# Patient Record
Sex: MALE | Race: WHITE | NOT HISPANIC OR LATINO | Employment: FULL TIME | URBAN - METROPOLITAN AREA
[De-identification: names, ages, dates, MRNs, and addresses within clinical notes are randomized per-mention and may not be internally consistent; named-entity substitution may affect disease eponyms.]

---

## 2017-03-06 ENCOUNTER — COMMUNICATION - HEALTHEAST (OUTPATIENT)
Dept: TELEHEALTH | Facility: CLINIC | Age: 37
End: 2017-03-06

## 2017-03-06 ENCOUNTER — OFFICE VISIT - HEALTHEAST (OUTPATIENT)
Dept: INTERNAL MEDICINE | Facility: CLINIC | Age: 37
End: 2017-03-06

## 2017-03-06 DIAGNOSIS — F33.9 MAJOR DEPRESSIVE DISORDER, RECURRENT EPISODE (H): ICD-10-CM

## 2018-01-02 ENCOUNTER — COMMUNICATION - HEALTHEAST (OUTPATIENT)
Dept: INTERNAL MEDICINE | Facility: CLINIC | Age: 38
End: 2018-01-02

## 2018-01-02 ENCOUNTER — OFFICE VISIT - HEALTHEAST (OUTPATIENT)
Dept: INTERNAL MEDICINE | Facility: CLINIC | Age: 38
End: 2018-01-02

## 2018-01-02 DIAGNOSIS — F33.9 MAJOR DEPRESSIVE DISORDER, RECURRENT EPISODE (H): ICD-10-CM

## 2018-04-10 ENCOUNTER — COMMUNICATION - HEALTHEAST (OUTPATIENT)
Dept: INTERNAL MEDICINE | Facility: CLINIC | Age: 38
End: 2018-04-10

## 2018-05-17 ENCOUNTER — COMMUNICATION - HEALTHEAST (OUTPATIENT)
Dept: INTERNAL MEDICINE | Facility: CLINIC | Age: 38
End: 2018-05-17

## 2019-04-27 ENCOUNTER — COMMUNICATION - HEALTHEAST (OUTPATIENT)
Dept: INTERNAL MEDICINE | Facility: CLINIC | Age: 39
End: 2019-04-27

## 2019-04-27 DIAGNOSIS — F33.1 MODERATE EPISODE OF RECURRENT MAJOR DEPRESSIVE DISORDER (H): ICD-10-CM

## 2019-07-23 ENCOUNTER — OFFICE VISIT - HEALTHEAST (OUTPATIENT)
Dept: INTERNAL MEDICINE | Facility: CLINIC | Age: 39
End: 2019-07-23

## 2019-07-23 DIAGNOSIS — N52.9 ERECTILE DYSFUNCTION, UNSPECIFIED ERECTILE DYSFUNCTION TYPE: ICD-10-CM

## 2019-07-23 DIAGNOSIS — Z11.3 ENCOUNTER FOR SCREENING EXAMINATION FOR SEXUALLY TRANSMITTED DISEASE: ICD-10-CM

## 2019-07-23 DIAGNOSIS — F33.1 MODERATE EPISODE OF RECURRENT MAJOR DEPRESSIVE DISORDER (H): ICD-10-CM

## 2019-07-24 ENCOUNTER — COMMUNICATION - HEALTHEAST (OUTPATIENT)
Dept: INTERNAL MEDICINE | Facility: CLINIC | Age: 39
End: 2019-07-24

## 2019-07-24 DIAGNOSIS — N52.9 ERECTILE DYSFUNCTION, UNSPECIFIED ERECTILE DYSFUNCTION TYPE: ICD-10-CM

## 2020-01-01 PROCEDURE — 93005 ELECTROCARDIOGRAM TRACING: CPT

## 2020-01-01 PROCEDURE — 96374 THER/PROPH/DIAG INJ IV PUSH: CPT

## 2020-01-01 PROCEDURE — 99285 EMERGENCY DEPT VISIT HI MDM: CPT | Mod: 25

## 2020-01-02 ENCOUNTER — HOSPITAL ENCOUNTER (EMERGENCY)
Facility: CLINIC | Age: 40
Discharge: HOME | End: 2020-01-02
Attending: EMERGENCY MEDICINE | Admitting: EMERGENCY MEDICINE

## 2020-01-02 ENCOUNTER — APPOINTMENT (OUTPATIENT)
Dept: GENERAL RADIOLOGY | Facility: CLINIC | Age: 40
End: 2020-01-02
Attending: EMERGENCY MEDICINE

## 2020-01-02 VITALS
HEART RATE: 67 BPM | OXYGEN SATURATION: 97 % | SYSTOLIC BLOOD PRESSURE: 119 MMHG | DIASTOLIC BLOOD PRESSURE: 77 MMHG | TEMPERATURE: 98.6 F | RESPIRATION RATE: 14 BRPM

## 2020-01-02 DIAGNOSIS — M25.511 RIGHT SHOULDER PAIN, UNSPECIFIED CHRONICITY: ICD-10-CM

## 2020-01-02 DIAGNOSIS — R07.89 NON-CARDIAC CHEST PAIN: ICD-10-CM

## 2020-01-02 LAB
ANION GAP SERPL CALCULATED.3IONS-SCNC: 3 MMOL/L (ref 3–14)
BASOPHILS # BLD AUTO: 0.1 10E9/L (ref 0–0.2)
BASOPHILS NFR BLD AUTO: 0.7 %
BUN SERPL-MCNC: 18 MG/DL (ref 7–30)
CALCIUM SERPL-MCNC: 8.3 MG/DL (ref 8.5–10.1)
CHLORIDE SERPL-SCNC: 109 MMOL/L (ref 94–109)
CO2 SERPL-SCNC: 28 MMOL/L (ref 20–32)
CREAT SERPL-MCNC: 1.21 MG/DL (ref 0.66–1.25)
D DIMER PPP FEU-MCNC: <0.3 UG/ML FEU (ref 0–0.5)
DIFFERENTIAL METHOD BLD: NORMAL
EOSINOPHIL # BLD AUTO: 0.1 10E9/L (ref 0–0.7)
EOSINOPHIL NFR BLD AUTO: 1.3 %
ERYTHROCYTE [DISTWIDTH] IN BLOOD BY AUTOMATED COUNT: 11.5 % (ref 10–15)
GFR SERPL CREATININE-BSD FRML MDRD: 75 ML/MIN/{1.73_M2}
GLUCOSE SERPL-MCNC: 95 MG/DL (ref 70–99)
HCT VFR BLD AUTO: 43.1 % (ref 40–53)
HGB BLD-MCNC: 14.2 G/DL (ref 13.3–17.7)
IMM GRANULOCYTES # BLD: 0 10E9/L (ref 0–0.4)
IMM GRANULOCYTES NFR BLD: 0.2 %
INTERPRETATION ECG - MUSE: NORMAL
LYMPHOCYTES # BLD AUTO: 4.6 10E9/L (ref 0.8–5.3)
LYMPHOCYTES NFR BLD AUTO: 43.3 %
MCH RBC QN AUTO: 30.1 PG (ref 26.5–33)
MCHC RBC AUTO-ENTMCNC: 32.9 G/DL (ref 31.5–36.5)
MCV RBC AUTO: 92 FL (ref 78–100)
MONOCYTES # BLD AUTO: 0.8 10E9/L (ref 0–1.3)
MONOCYTES NFR BLD AUTO: 7.5 %
NEUTROPHILS # BLD AUTO: 5 10E9/L (ref 1.6–8.3)
NEUTROPHILS NFR BLD AUTO: 47 %
NRBC # BLD AUTO: 0 10*3/UL
NRBC BLD AUTO-RTO: 0 /100
PLATELET # BLD AUTO: 301 10E9/L (ref 150–450)
POTASSIUM SERPL-SCNC: 4.2 MMOL/L (ref 3.4–5.3)
RBC # BLD AUTO: 4.71 10E12/L (ref 4.4–5.9)
SODIUM SERPL-SCNC: 140 MMOL/L (ref 133–144)
TROPONIN I SERPL-MCNC: <0.015 UG/L (ref 0–0.04)
WBC # BLD AUTO: 10.5 10E9/L (ref 4–11)

## 2020-01-02 PROCEDURE — 25000128 H RX IP 250 OP 636: Performed by: EMERGENCY MEDICINE

## 2020-01-02 PROCEDURE — 80048 BASIC METABOLIC PNL TOTAL CA: CPT | Performed by: EMERGENCY MEDICINE

## 2020-01-02 PROCEDURE — 25000132 ZZH RX MED GY IP 250 OP 250 PS 637: Performed by: EMERGENCY MEDICINE

## 2020-01-02 PROCEDURE — 85025 COMPLETE CBC W/AUTO DIFF WBC: CPT | Performed by: EMERGENCY MEDICINE

## 2020-01-02 PROCEDURE — 71046 X-RAY EXAM CHEST 2 VIEWS: CPT

## 2020-01-02 PROCEDURE — 84484 ASSAY OF TROPONIN QUANT: CPT | Performed by: EMERGENCY MEDICINE

## 2020-01-02 PROCEDURE — 85379 FIBRIN DEGRADATION QUANT: CPT | Performed by: EMERGENCY MEDICINE

## 2020-01-02 RX ORDER — KETOROLAC TROMETHAMINE 15 MG/ML
15 INJECTION, SOLUTION INTRAMUSCULAR; INTRAVENOUS ONCE
Status: COMPLETED | OUTPATIENT
Start: 2020-01-02 | End: 2020-01-02

## 2020-01-02 RX ORDER — ASPIRIN 81 MG/1
324 TABLET, CHEWABLE ORAL ONCE
Status: COMPLETED | OUTPATIENT
Start: 2020-01-02 | End: 2020-01-02

## 2020-01-02 RX ADMIN — ASPIRIN 81 MG 324 MG: 81 TABLET ORAL at 00:31

## 2020-01-02 RX ADMIN — KETOROLAC TROMETHAMINE 15 MG: 15 INJECTION, SOLUTION INTRAMUSCULAR; INTRAVENOUS at 00:31

## 2020-01-02 ASSESSMENT — ENCOUNTER SYMPTOMS
FEVER: 0
BACK PAIN: 1
NUMBNESS: 0
NECK PAIN: 1
ABDOMINAL PAIN: 0

## 2020-01-02 NOTE — ED PROVIDER NOTES
"  History     Chief Complaint:  Chest pain    HPI   Anival Moore is a 39 year old male with a history of recurrent pneumothorax who presents with chest pain. He states that he had some pain in his calf in the morning 2 days ago (12/31). He states that this felt like a \"blood flow issue\" but denies numbness or tingling and states that this later resolved. He states that he went to work as normal that day, got home from work, smoked a cigarette, and developed chest pain and chest pressure that lasted about 30 seconds shortly after this. He denies pain with breathing. He states the he developed right sided neck pain, back pain, and arm pain later that evening. He states that his neck pain, back pain, and arm pain have remained at a constant level since they started and have not been significantly ameliorated or exacerbated by anything in particular. He denies numbness, fever, or abdominal pain. He states that he has been taking Naproxen with limited relief of his symptoms and is presenting to the emergency department tonight because of the extended duration of his symptoms. He states that his symptoms feel somewhat like his history of pneumothoraces except that his current symptoms have been less acute.    Allergies:  No known drug allergies     Medications:    Effexor    Past Medical History:    Depression  Anxiety  Pneumothorax x3    Past Surgical History:    Appendectomy  Clavicle surgery  Thoracoscopy with pleurodesis    Family History:    No past pertinent family history.    Social History:  Smoking status: 1-2 cigarettes per day.  Alcohol use: Occasional    Marital Status:  Single     Review of Systems   Constitutional: Negative for fever.   Cardiovascular: Positive for chest pain.   Gastrointestinal: Negative for abdominal pain.   Musculoskeletal: Positive for back pain and neck pain.        Right arm pain   Neurological: Negative for numbness.   All other systems reviewed and are negative.      Physical Exam "     Patient Vitals for the past 24 hrs:   BP Temp Heart Rate Resp SpO2   01/02/20 0130 119/77 -- 68 -- 97 %   01/02/20 0115 113/75 -- 66 -- 97 %   01/02/20 0100 116/78 -- 64 14 97 %   01/02/20 0045 119/82 -- 71 9 97 %   01/02/20 0030 119/77 -- 68 21 97 %   01/02/20 0003 137/103 98.6  F (37  C) 71 16 98 %     Physical Exam  Nursing note and vitals reviewed.  Constitutional: Cooperative.   HENT:   Mouth/Throat: Mucous membranes are normal.   Cardiovascular: Normal rate, regular rhythm and normal heart sounds.  No murmur.  Pulmonary/Chest: Effort normal and breath sounds normal. No respiratory distress. No wheezes. No rales.   Abdominal: Soft. Normal appearance and bowel sounds are normal. No distension. There is no tenderness.  Musculoskeletal: No LE edema  Neurological: Alert. Oriented x4  Skin: Skin is warm and dry.   Psychiatric: Normal mood and affect.     Emergency Department Course   ECG:  Indication: Chest pain  Time: 0021  Vent. Rate 71 bpm. PA interval 188. QRS duration 82. QT/QTc 378/410. P-R-T axis 10 53 53. Normal sinus rhythm. Normal ECG. Read time: 0025.    Imaging:  Radiographic findings were communicated with the patient who voiced understanding of the findings.  XR Chest PA and LAT:   No acute abnormality.    Read as per radiology.    Laboratory:  CBC: WBC: 10.5, HGB: 14.2, PLT: 301  BMP: Glucose 95, calcium: 8.3 (L), o/w WNL (Creatinine: 1.21)    0025 Troponin: <0.015    D dimer: <0.3    Interventions:  0031: Toradol 15 mg IV    Emergency Department Course:  Nursing notes and vitals reviewed. (0014) I performed an exam of the patient as documented above.     Medicine administered as documented above. Blood drawn. This was sent to the lab for further testing, results above.    The patient was sent for a chest x-ray while in the emergency department, findings above.     0137 I rechecked the patient and discussed the results of his workup thus far.     Findings and plan explained to the Patient. Patient  discharged home with instructions regarding supportive care and reasons to return. The importance of close follow-up was reviewed.    I personally reviewed the laboratory results with the Patient and answered all related questions prior to discharge.    Impression & Plan      Medical Decision Making:  Anival Moore is a 39 year old male with a history of spontaneous pneumothoraces who presents with chest discomfort and right shoulder pain for the past 24+ hours. Workup here is normal. No pneumothorax, pneumonia, or enlarged cardiac silhouette on chest x-ray. Dimer is negative, making pulmonary embolism unlikely. He has had no infectious symptoms. No falls or trauma. I did press deeply in the right upper quadrant with no tenderness making gallbladder pathology with pain referred to the right shoulder unlikely. Etiology of his pain is uncertain but does not appear to represent a life-threatening process at this time.      Diagnosis:    ICD-10-CM    1. Non-cardiac chest pain R07.89    2. Right shoulder pain M25.511        Disposition:  discharged to home    Elvis Mccormick  1/1/2020   [facility] EMERGENCY DEPARTMENT  Scribe Disclosure:  I, Elvis Mccormick, am serving as a scribe on 1/2/2020 at 12:14 AM to personally document services performed by Elvis Amin MD based on my observations and the provider's statements to me.      Elvis Amin MD  01/02/20 [id number]

## 2020-01-02 NOTE — ED AVS SNAPSHOT
[facility] Emergency Department  [address]  [facility] [address]  Phone:  [phone]  Fax:  [phone]                                    Anival Moore   MRN: [id number]    Department:  [facility] Emergency Department   Date of Visit:  1/1/2020           After Visit Summary Signature Page    I have received my discharge instructions, and my questions have been answered. I have discussed any challenges I see with this plan with the nurse or doctor.    ..........................................................................................................................................  Patient/Patient Representative Signature      ..........................................................................................................................................  Patient Representative Print Name and Relationship to Patient    ..................................................               ................................................  Date                                   Time    ..........................................................................................................................................  Reviewed by Signature/Title    ...................................................              ..............................................  Date                                               Time          22EPIC Rev 08/18

## 2020-01-02 NOTE — ED TRIAGE NOTES
Pt with onset of severe chest pain last evening lasting 30 seconds.  Pain went away on own.  Since pt c/o right sided chest pain, through to back, and down right shoulder.  Pt states it all happened after smoking cigarette.  Also pt does have a hx of 3 spontaneous pneumothorax.  Pt states it feel like his last pneumothorax.

## 2020-03-19 ENCOUNTER — COMMUNICATION - HEALTHEAST (OUTPATIENT)
Dept: INTERNAL MEDICINE | Facility: CLINIC | Age: 40
End: 2020-03-19

## 2020-03-19 DIAGNOSIS — F33.1 MODERATE EPISODE OF RECURRENT MAJOR DEPRESSIVE DISORDER (H): ICD-10-CM

## 2020-03-29 ENCOUNTER — COMMUNICATION - HEALTHEAST (OUTPATIENT)
Dept: INTERNAL MEDICINE | Facility: CLINIC | Age: 40
End: 2020-03-29

## 2020-03-29 DIAGNOSIS — F33.1 MODERATE EPISODE OF RECURRENT MAJOR DEPRESSIVE DISORDER (H): ICD-10-CM

## 2020-07-20 ENCOUNTER — COMMUNICATION - HEALTHEAST (OUTPATIENT)
Dept: INTERNAL MEDICINE | Facility: CLINIC | Age: 40
End: 2020-07-20

## 2020-07-20 DIAGNOSIS — F33.1 MODERATE EPISODE OF RECURRENT MAJOR DEPRESSIVE DISORDER (H): ICD-10-CM

## 2020-12-01 ENCOUNTER — COMMUNICATION - HEALTHEAST (OUTPATIENT)
Dept: INTERNAL MEDICINE | Facility: CLINIC | Age: 40
End: 2020-12-01

## 2020-12-01 DIAGNOSIS — F33.1 MODERATE EPISODE OF RECURRENT MAJOR DEPRESSIVE DISORDER (H): ICD-10-CM

## 2020-12-02 ENCOUNTER — COMMUNICATION - HEALTHEAST (OUTPATIENT)
Dept: INTERNAL MEDICINE | Facility: CLINIC | Age: 40
End: 2020-12-02

## 2020-12-15 ENCOUNTER — OFFICE VISIT - HEALTHEAST (OUTPATIENT)
Dept: INTERNAL MEDICINE | Facility: CLINIC | Age: 40
End: 2020-12-15

## 2020-12-15 DIAGNOSIS — F33.1 MODERATE EPISODE OF RECURRENT MAJOR DEPRESSIVE DISORDER (H): ICD-10-CM

## 2021-02-01 ENCOUNTER — ANCILLARY PROCEDURE (OUTPATIENT)
Dept: GENERAL RADIOLOGY | Facility: CLINIC | Age: 41
End: 2021-02-01
Attending: FAMILY MEDICINE
Payer: MEDICAID

## 2021-02-01 ENCOUNTER — OFFICE VISIT (OUTPATIENT)
Dept: FAMILY MEDICINE | Facility: CLINIC | Age: 41
End: 2021-02-01
Payer: MEDICAID

## 2021-02-01 VITALS
DIASTOLIC BLOOD PRESSURE: 84 MMHG | HEART RATE: 90 BPM | RESPIRATION RATE: 14 BRPM | BODY MASS INDEX: 22.35 KG/M2 | SYSTOLIC BLOOD PRESSURE: 130 MMHG | HEIGHT: 72 IN | WEIGHT: 165 LBS | TEMPERATURE: 98.2 F

## 2021-02-01 DIAGNOSIS — M54.16 LUMBAR BACK PAIN WITH RADICULOPATHY AFFECTING RIGHT LOWER EXTREMITY: Primary | ICD-10-CM

## 2021-02-01 DIAGNOSIS — M54.16 LUMBAR BACK PAIN WITH RADICULOPATHY AFFECTING RIGHT LOWER EXTREMITY: ICD-10-CM

## 2021-02-01 PROCEDURE — 99203 OFFICE O/P NEW LOW 30 MIN: CPT | Performed by: FAMILY MEDICINE

## 2021-02-01 PROCEDURE — 72100 X-RAY EXAM L-S SPINE 2/3 VWS: CPT | Performed by: RADIOLOGY

## 2021-02-01 RX ORDER — CYCLOBENZAPRINE HCL 5 MG
5 TABLET ORAL 2 TIMES DAILY PRN
Qty: 30 TABLET | Refills: 0 | Status: SHIPPED | OUTPATIENT
Start: 2021-02-01 | End: 2021-02-16

## 2021-02-01 RX ORDER — VENLAFAXINE HYDROCHLORIDE 150 MG/1
CAPSULE, EXTENDED RELEASE ORAL DAILY
COMMUNITY
Start: 2020-12-03 | End: 2022-01-04

## 2021-02-01 RX ORDER — OXYCODONE HYDROCHLORIDE 5 MG/1
5 TABLET ORAL
Qty: 7 TABLET | Refills: 0 | Status: SHIPPED | OUTPATIENT
Start: 2021-02-01 | End: 2021-02-08

## 2021-02-01 RX ORDER — PREDNISONE 20 MG/1
40 TABLET ORAL DAILY
Qty: 10 TABLET | Refills: 0 | Status: SHIPPED | OUTPATIENT
Start: 2021-02-01 | End: 2021-02-06

## 2021-02-01 ASSESSMENT — ENCOUNTER SYMPTOMS
NUMBNESS: 1
DYSPHORIC MOOD: 0
FEVER: 0
FATIGUE: 0
COUGH: 0
SHORTNESS OF BREATH: 0
BACK PAIN: 1

## 2021-02-01 ASSESSMENT — PATIENT HEALTH QUESTIONNAIRE - PHQ9
10. IF YOU CHECKED OFF ANY PROBLEMS, HOW DIFFICULT HAVE THESE PROBLEMS MADE IT FOR YOU TO DO YOUR WORK, TAKE CARE OF THINGS AT HOME, OR GET ALONG WITH OTHER PEOPLE: SOMEWHAT DIFFICULT
SUM OF ALL RESPONSES TO PHQ QUESTIONS 1-9: 5
SUM OF ALL RESPONSES TO PHQ QUESTIONS 1-9: 5

## 2021-02-01 ASSESSMENT — MIFFLIN-ST. JEOR: SCORE: 1701.44

## 2021-02-01 NOTE — PATIENT INSTRUCTIONS
Patient Education     Understanding Lumbar Radiculopathy    Lumbar radiculopathy is irritation or inflammation of a nerve root in the low back. It causes symptoms that spread out from the back down one or both legs. To understand this condition, it helps to understand the parts of the spine:    Vertebrae. These are bones that stack to form the spine. The lumbar spine contains 5 vertebrae near the bottom of your spine.    Disks. These are soft pads of tissue between the vertebrae. They act as shock absorbers for the spine.    Spinal canal. This is a tunnel formed within the stacked vertebrae. In the lumbar spine, nerves run through this canal.    Nerves. These branch off and leave the spinal canal, traveling out to parts of the body. As they leave the spinal canal, nerves pass through openings between the vertebrae. The nerve root is the part of the nerve that is closest to the spinal canal.    Sciatic nerve. This is a large nerve formed from several nerve roots in the low back. This nerve extends down the back of the leg to the foot.  With lumbar radiculopathy, nerve roots in the low back become irritated. This leads to pain and symptoms. The sciatic nerve is commonly involved, so the condition is often called sciatica.  What causes lumbar radiculopathy?  Aging, injury, poor posture, extra body weight, and other issues can lead to problems in the low back. These problems may then irritate nerve roots. They include:    Damage to a disk in the lumbar spine. The damaged disk may then press on nearby nerve roots.    Degeneration from wear and tear, and aging. This can lead to narrowing (stenosis) of the openings between the vertebrae. The narrowed openings press on nerve roots as they leave the spinal canal.    Unstable spine. This is when a vertebra slips forward. It can then press on a nerve root.  Other, less common things can put pressure on nerves in the low back. These include diabetes, infection, or a  tumor.  Symptoms of lumbar radiculopathy  These include:    Pain in the low back    Pain, numbness, tingling, or weakness that travels into the buttocks, hip, groin, or leg    Muscle spasms in the low back, or leg  Treatment for lumbar radiculopathy  In most cases, your healthcare provider will first try treatments that help relieve symptoms. These may include:    Prescription and over-the-counter pain medicines. These help relieve pain, swelling, and irritation.    Limits on positions and activities that increase pain. But lying in bed or avoiding all movement is only recommended for a short period of time.    Physical therapy, including exercises and stretches. This helps decrease pain and increase movement and function.    Steroid shots into the lower back. This may help relieve symptoms for a time.    Weight-loss program. If you are overweight, losing extra pounds (kilograms) may help relieve symptoms.  In some cases, you may need surgery to fix the underlying problem. This depends on the cause, the symptoms, and how long the pain has lasted.  Possible complications  Over time, an irritated and inflamed nerve may become damaged. This may lead to long-lasting (permanent) numbness or weakness in your legs and feet. If symptoms change suddenly or get worse, be sure to let your healthcare provider know.  When to call your healthcare provider  Call your healthcare provider right away if you have any of these:    New pain or pain that gets worse    New or increasing weakness, tingling, or numbness in your leg or foot    Problems controlling your bladder or bowel  Ajay last reviewed this educational content on 2/1/2020 2000-2020 [employer]. [address] [place] [address], [place] [address]. All rights reserved. This information is not intended as a substitute for professional medical care. Always follow your healthcare professional's instructions.

## 2021-02-01 NOTE — PROGRESS NOTES
Assessment & Plan     Lumbar back pain with radiculopathy affecting right lower extremity    - XR Lumbar Spine 2/3 Views; Future  - predniSONE (DELTASONE) 20 MG tablet; Take 2 tablets (40 mg) by mouth daily for 5 days  - cyclobenzaprine (FLEXERIL) 5 MG tablet; Take 1 tablet (5 mg) by mouth 2 times daily as needed for muscle spasms  - oxyCODONE (ROXICODONE) 5 MG tablet; Take 1 tablet (5 mg) by mouth nightly as needed for severe pain  - MR Lumbar Spine w/o Contrast; Future  - Orthopedic & Spine  Referral; Future    -Experiencing severe pain in lower back radiating to the right leg.  -Consistent with L5-S1 radiculopathy.  -X-ray does not suggest any acute changes.  -Positive straight leg raising test.    -In view of lower back pain fails to improve with conservative treatment, right leg radiculopathy would recommend to proceed with MRI lumbar spine  -Explain patient will reach out  to him with results.    -Recommend to avoid bending and twisting and lower back, recommend to avoid lifting heavy weight,  -  Patient was in emergency room with any weakness in his legs, any difficulty passing urination.      Return in about 4 weeks (around 3/1/2021) for Follow up.    Cait Orourke MD  [facility] is a 40 year old who presents to clinic today for the following health issues     History of Present Illness       Back Pain:  He presents for follow up of back pain. Patient's back pain is a new problem.    Original cause of back pain: not sure  First noticed back pain: more than 1 month ago  Patient feels back pain: constantlyLocation of back pain:  Right lower back, right buttock, right hip and right side of waist  Description of back pain: cramping, dull ache, fullness, sharp, shooting and stabbing  Back pain spreads: right buttocks, right thigh, right knee and right foot    Since patient first noticed back pain, pain is: always present, but gets better and worse  Does  back pain interfere with his job:  Not applicable  On a scale of 1-10 (10 being the worst), patient describes pain as:  10  What makes back pain worse: bending, coughing, certain positions, lying down, sitting and standing  Acupuncture: not tried  Acetaminophen: not helpful  Activity or exercise: helpful  Chiropractor:  Not tried  Cold: not helpful  Heat: not helpful  Massage: helpful  Muscle relaxants: not tried  NSAIDS: helpful  Opioids: not tried  Physical Therapy: not tried  Rest: not helpful  Steroid Injection: not tried  Stretching: helpful  Surgery: not tried  TENS unit: not tried  Topical pain relievers: not tried  Other healthcare providers patient is seeing for back pain: None    He eats 0-1 servings of fruits and vegetables daily.He consumes 1 sweetened beverage(s) daily.He exercises with enough effort to increase his heart rate 9 or less minutes per day.  He exercises with enough effort to increase his heart rate 3 or less days per week.   He is taking medications regularly.     Answers for HPI/ROS submitted by the patient on 2/1/2021   Chronic problems general questions HPI Form  If you checked off any problems, how difficult have these problems made it for you to do your work, take care of things at home, or get along with other people?: Somewhat difficult  PHQ9 TOTAL SCORE: 5    Future Appointments   Date Time Provider Department Center   2/1/2021  3:10 PM Cait Orourke MD [facility]     Appointment Notes for this encounter:     Health Maintenance Due   Topic Date Due     PREVENTIVE CARE VISIT  1980     DEPRESSION ACTION PLAN  1981     PHQ-9  1981     HIV SCREENING  12/25/1996     HEPATITIS C SCREENING  12/25/1999     LIPID  12/25/2016     INFLUENZA VACCINE (1) 09/01/2020     Health Maintenance addressed:  Immunizations and PHQ9    PHQ9 / GAB / ACT Gave pt questionnaire to complete and Immunizations Pt declined    MyChart Status:  Not Active Signed pt up for MyChart      Review of  Systems   Constitutional: Negative for fatigue and fever.   Respiratory: Negative for cough and shortness of breath.    Musculoskeletal: Positive for back pain and gait problem.   Neurological: Positive for numbness.   Psychiatric/Behavioral: Negative for dysphoric mood.            Objective    /84   Pulse 90   Temp 98.2  F (36.8  C) (Oral)   Resp 14   Ht 1.829 m (6')   Wt 74.8 kg (165 lb)   BMI 22.38 kg/m    Body mass index is 22.38 kg/m .  Physical Exam  Vitals signs and nursing note reviewed.   Constitutional:       Appearance: Normal appearance.   HENT:      Head: Normocephalic.   Cardiovascular:      Rate and Rhythm: Normal rate and regular rhythm.      Pulses: Normal pulses.      Heart sounds: Normal heart sounds.   Pulmonary:      Effort: Pulmonary effort is normal.      Breath sounds: Normal breath sounds.   Abdominal:      General: Abdomen is flat.      Palpations: Abdomen is soft.   Musculoskeletal: Normal range of motion.      Right lower leg: No edema.      Left lower leg: No edema.      Comments: Positive straight leg raising test .   Skin:     General: Skin is warm and dry.   Neurological:      General: No focal deficit present.      Mental Status: He is alert and oriented to person, place, and time.   Psychiatric:         Mood and Affect: Mood normal.

## 2021-02-02 ASSESSMENT — PATIENT HEALTH QUESTIONNAIRE - PHQ9: SUM OF ALL RESPONSES TO PHQ QUESTIONS 1-9: 5

## 2021-02-05 ENCOUNTER — TELEPHONE (OUTPATIENT)
Dept: FAMILY MEDICINE | Facility: CLINIC | Age: 41
End: 2021-02-05

## 2021-02-05 DIAGNOSIS — M54.41 LOW BACK PAIN WITH RIGHT-SIDED SCIATICA, UNSPECIFIED BACK PAIN LATERALITY, UNSPECIFIED CHRONICITY: Primary | ICD-10-CM

## 2021-02-05 RX ORDER — PREDNISONE 10 MG/1
TABLET ORAL
Qty: 5 TABLET | Refills: 0 | Status: SHIPPED | OUTPATIENT
Start: 2021-02-05 | End: 2021-03-16

## 2021-02-05 NOTE — TELEPHONE ENCOUNTER
Thanks for your message , we have to use tapering dose of prednisone for greater than 5 days .  Recommend 10 mg for 3 days , 5 mg for 3 days .  New prescription sent .    Cait Orourke M.D

## 2021-02-05 NOTE — TELEPHONE ENCOUNTER
Dr. Orourke, please see message below from patient and advise. Appears if taken as directed he should finish his Prednisone ordered tomorrow.     Roopa Armenta R.N.

## 2021-02-05 NOTE — TELEPHONE ENCOUNTER
Patient states only real medication that has been helping with the pain is the predniSONE (DELTASONE) 20 MG tablet    Looking to get refill at least till 02/15 or so when he has his surgery or sees Neurology.     Gracie Hall/

## 2021-02-05 NOTE — TELEPHONE ENCOUNTER
Patient scheduled his appointment regarding his back pain on 2/16/2021. He is currently waiting for his [email or web address] insurance to become active. Patient asked me to reach out to you and see if you could contact him at [phone] to discuss extending his prescription until his appt. Thank you!

## 2021-02-05 NOTE — TELEPHONE ENCOUNTER
Please find out which medication he is looking to renew .  Oxycodone is a controlled substance not intended for long term use , other prescription can be renewed .    Thanks   Cait Orourke M.D.

## 2021-02-08 NOTE — TELEPHONE ENCOUNTER
Call placed to Anival--He had already picked up the prescription that was sent late on Friday. No further concerns indicated. Roopa Armenta R.N.

## 2021-02-19 ENCOUNTER — OFFICE VISIT (OUTPATIENT)
Dept: NEUROSURGERY | Facility: CLINIC | Age: 41
End: 2021-02-19
Attending: FAMILY MEDICINE
Payer: MEDICAID

## 2021-02-19 VITALS
BODY MASS INDEX: 22.48 KG/M2 | OXYGEN SATURATION: 98 % | DIASTOLIC BLOOD PRESSURE: 92 MMHG | HEART RATE: 110 BPM | WEIGHT: 166 LBS | HEIGHT: 72 IN | TEMPERATURE: 98.8 F | SYSTOLIC BLOOD PRESSURE: 133 MMHG

## 2021-02-19 DIAGNOSIS — M54.16 LUMBAR RADICULOPATHY: Primary | ICD-10-CM

## 2021-02-19 PROCEDURE — 99204 OFFICE O/P NEW MOD 45 MIN: CPT | Performed by: PHYSICIAN ASSISTANT

## 2021-02-19 RX ORDER — CYCLOBENZAPRINE HCL 5 MG
5 TABLET ORAL 2 TIMES DAILY PRN
COMMUNITY
End: 2021-03-16

## 2021-02-19 ASSESSMENT — PAIN SCALES - GENERAL: PAINLEVEL: MILD PAIN (3)

## 2021-02-19 ASSESSMENT — MIFFLIN-ST. JEOR: SCORE: 1705.97

## 2021-02-19 NOTE — LETTER
2/19/2021         RE: Anival Moore  [address] [facility] [address]        Dear Colleague,    Thank you for referring your patient, Anival Moore, to the [facility]UROS[facility]GERY [facility] [place]. Please see a copy of my visit note below.    NEUROSURGERY CLINIC CONSULT NOTE     DATE OF VISIT: 2/19/2021     SUBJECTIVE:     Anival Moore is a pleasant 40 year old male who presents to the clinic today for consultation on lumbar spine pain with right leg radiculopathy. He is referred to the Neurosurgery Clinic by Dr. Orourke in Primary Care.    Today, he reports a 3-month history of symptoms. He describes constant, sharp, burning pain that initiates in the midline low lumbar region and radiates distally in what sounds like the right L5 distribution. This pain is accompanied by paresthesia, numbness and perceived weakness in the same distribution. He really cannot say what aggravates or alleviates his symptoms. No mechanism of injury such as trauma or a fall is associated with the onset of the pain although he thinks a car ride from [place] may have provoked the pain. There are no bowel or bladder changes. He denies saddle anesthesia. He states that he has experienced falling episodes due to his leg giving out on him.  He has participated in conservative therapies to include a HEP, NSAIDs, and prednisone. None of these modalities have provided any significant long term relief.          Current Outpatient Medications:      cyclobenzaprine (FLEXERIL) 5 MG tablet, Take 5 mg by mouth 2 times daily as needed for muscle spasms, Disp: , Rfl:      venlafaxine (EFFEXOR-XR) 150 MG 24 hr capsule, , Disp: , Rfl:      predniSONE (DELTASONE) 10 MG tablet, 10 MG for 3 days , 5 mg for 3 days . (Patient not taking: Reported on 2/19/2021), Disp: 5 tablet, Rfl: 0     No Known Allergies     Past Medical History:   Diagnosis Date     Anxiety      Depression      Spontaneous pneumothorax         ROS: 10 point review of  symptoms are negative other than the symptoms noted above in the HPI.     Family History has been reviewed with the patient, there are no pertinent findings to presenting concern.     Past Surgical History:   Procedure Laterality Date     APPENDECTOMY  1989     CLAVICLE SURGERY  2006     THORACIC SURGERY - THORACOSCOPY W PLEURODESIS  7618-2392/2003     spontaneous pneumonia        Social History     Tobacco Use     Smoking status: Never Smoker     Smokeless tobacco: Never Used   Substance Use Topics     Alcohol use: Yes     Alcohol/week: 0.0 standard drinks     Comment: occasional     Drug use: No        OBJECTIVE:   BP (!) 133/92   Pulse 110   Temp 98.8  F (37.1  C)   Ht 6' (1.829 m)   Wt 166 lb (75.3 kg)   SpO2 98%   BMI 22.51 kg/m     Body mass index is 22.51 kg/m .     Imaging:     XR LUMBAR SPINE 2-3 VIEWS 2/1/2021 4:05 PM     Findings, per radiologist read, notable for:      Transitional L5 vertebral body. Mild sclerosis in the  articulation between the L5 transverse processes and underlying sacral  ala. Normal vertebral body heights and alignment. Normal interbody  spaces and facets.    Full radiological report in chart. Imaging was reviewed with with patient today.     Exam:     Patient appears comfortable, conversational, and in no apparent distress.   Head: Normocephalic, without obvious abnormality, atraumatic, no facial asymmetry.   Eyes: conjunctivae/corneas clear. PERRL, EOM's intact.   Throat: lips, mucosa, and tongue normal; teeth and gums normal.   Neck: supple, symmetrical, trachea midline, no adenopathy and thyroid: not enlarged, symmetric, no tenderness/mass/nodules.   Lungs: clear to auscultation bilaterally.   Heart: regular rate and rhythm.   Abdomen: soft, non-tender; bowel sounds normal; no masses, no organomegaly.   Pulses: 2+ and symmetric.   Skin: Skin color, texture, turgor normal. No rashes or lesions.     CN II-XII grossly intact, alert and appropriate with conversation and  following commands.   Gait is non-antalgic. Able to tandem walk. Able to walk on toes and heels without difficulty.   Cervical spine is non tender to palpation. Appropriate range of motion of neck, not concerning for lhermitte's phenomenon.   Bilateral bicep 2/4 and tricep reflexes 1/4. Sensation intact throughout upper extremities.     UE muscle strength  Right  Left    Deltoid  5/5  5/5    Biceps  5/5  5/5    Triceps  5/5  5/5    Hand intrinsics  5/5  5/5    Hand grasp  5/5  5/5    Sood signs  neg  neg      Lumbar spine is non tender to palpation.  Intact sensation throughout lower extremities.   Bilateral patellar 2/4 and achilles reflex 1/4. Negative for pain with straight leg raise.     LE muscle strength  Right  Left    Iliopsoas (hip flexion)  5/5  5/5    Quad (knee extension)  5/5  5/5    Hamstring (knee flexion)  5/5  5/5    Gastrocnemius (PF)  5/5  5/5    Tibialis Ant. (DF)  5/5  5/5    EHL  5/5  5/5      Negative Babinski bilaterally. Negative for clonus.   Calves are soft and non-tender bilaterally.     ASSESSMENT/PLAN:     Anival Moore is a 40 year old male who presents to the clinic for consultation on a constant, sharp, burning pain that initiates in the midline low lumbar region and radiates distally in what sounds like the right L5 distribution. This pain is accompanied by paresthesia, numbness and perceived weakness in the same distribution. The patient's most recent imaging was reviewed with him today. It was explained that images show normal vertebral body heights and alignment as well as normal interbody spaces and facets. On exam, he is noted to have appropriate strength, sensation and range of motion. He has attempted conservative management with a HEP, NSAIDs, and prednisone, without resolution of symptoms.     Based on his physical exam, we do feel that it would be in his best interest to obtain a lumbar MRI to further assess our concern for lumbar stenosis, a bulging / herniated disk or  other concerning pathology. Once the images have been obtained she has agreed to call our office back at [phone] to further discuss possible surgical interventions or other conservative therapies.      We also discussed signs of a worsening problem that he should seek being evaluated.        Respectfully,     TEOFILO Davila PA-C  [facility] Neurosurgery  [facility]  Tel: [phone]      Exam, imaging, and plan reviewed by Dr. Saul.       Again, thank you for allowing me to participate in the care of your patient.        Sincerely,        Mikey Guo PA-C

## 2021-02-19 NOTE — PROGRESS NOTES
NEUROSURGERY CLINIC CONSULT NOTE     DATE OF VISIT: 2/19/2021     SUBJECTIVE:     Anival Moore is a pleasant 40 year old male who presents to the clinic today for consultation on lumbar spine pain with right leg radiculopathy. He is referred to the Neurosurgery Clinic by Dr. Orourke in Primary Care.    Today, he reports a 3-month history of symptoms. He describes constant, sharp, burning pain that initiates in the midline low lumbar region and radiates distally in what sounds like the right L5 distribution. This pain is accompanied by paresthesia, numbness and perceived weakness in the same distribution. He really cannot say what aggravates or alleviates his symptoms. No mechanism of injury such as trauma or a fall is associated with the onset of the pain although he thinks a car ride from [place] may have provoked the pain. There are no bowel or bladder changes. He denies saddle anesthesia. He states that he has experienced falling episodes due to his leg giving out on him.  He has participated in conservative therapies to include a HEP, NSAIDs, and prednisone. None of these modalities have provided any significant long term relief.          Current Outpatient Medications:      cyclobenzaprine (FLEXERIL) 5 MG tablet, Take 5 mg by mouth 2 times daily as needed for muscle spasms, Disp: , Rfl:      venlafaxine (EFFEXOR-XR) 150 MG 24 hr capsule, , Disp: , Rfl:      predniSONE (DELTASONE) 10 MG tablet, 10 MG for 3 days , 5 mg for 3 days . (Patient not taking: Reported on 2/19/2021), Disp: 5 tablet, Rfl: 0     No Known Allergies     Past Medical History:   Diagnosis Date     Anxiety      Depression      Spontaneous pneumothorax         ROS: 10 point review of symptoms are negative other than the symptoms noted above in the HPI.     Family History has been reviewed with the patient, there are no pertinent findings to presenting concern.     Past Surgical History:   Procedure Laterality Date     APPENDECTOMY  1989      CLAVICLE SURGERY  2006     THORACIC SURGERY - THORACOSCOPY W PLEURODESIS  2595-1434/2003     spontaneous pneumonia        Social History     Tobacco Use     Smoking status: Never Smoker     Smokeless tobacco: Never Used   Substance Use Topics     Alcohol use: Yes     Alcohol/week: 0.0 standard drinks     Comment: occasional     Drug use: No        OBJECTIVE:   BP (!) 133/92   Pulse 110   Temp 98.8  F (37.1  C)   Ht 6' (1.829 m)   Wt 166 lb (75.3 kg)   SpO2 98%   BMI 22.51 kg/m     Body mass index is 22.51 kg/m .     Imaging:     XR LUMBAR SPINE 2-3 VIEWS 2/1/2021 4:05 PM     Findings, per radiologist read, notable for:      Transitional L5 vertebral body. Mild sclerosis in the  articulation between the L5 transverse processes and underlying sacral  ala. Normal vertebral body heights and alignment. Normal interbody  spaces and facets.    Full radiological report in chart. Imaging was reviewed with with patient today.     Exam:     Patient appears comfortable, conversational, and in no apparent distress.   Head: Normocephalic, without obvious abnormality, atraumatic, no facial asymmetry.   Eyes: conjunctivae/corneas clear. PERRL, EOM's intact.   Throat: lips, mucosa, and tongue normal; teeth and gums normal.   Neck: supple, symmetrical, trachea midline, no adenopathy and thyroid: not enlarged, symmetric, no tenderness/mass/nodules.   Lungs: clear to auscultation bilaterally.   Heart: regular rate and rhythm.   Abdomen: soft, non-tender; bowel sounds normal; no masses, no organomegaly.   Pulses: 2+ and symmetric.   Skin: Skin color, texture, turgor normal. No rashes or lesions.     CN II-XII grossly intact, alert and appropriate with conversation and following commands.   Gait is non-antalgic. Able to tandem walk. Able to walk on toes and heels without difficulty.   Cervical spine is non tender to palpation. Appropriate range of motion of neck, not concerning for lhermitte's phenomenon.   Bilateral bicep 2/4 and  tricep reflexes 1/4. Sensation intact throughout upper extremities.     UE muscle strength  Right  Left    Deltoid  5/5  5/5    Biceps  5/5  5/5    Triceps  5/5  5/5    Hand intrinsics  5/5  5/5    Hand grasp  5/5  5/5    Sood signs  neg  neg      Lumbar spine is non tender to palpation.  Intact sensation throughout lower extremities.   Bilateral patellar 2/4 and achilles reflex 1/4. Negative for pain with straight leg raise.     LE muscle strength  Right  Left    Iliopsoas (hip flexion)  5/5  5/5    Quad (knee extension)  5/5  5/5    Hamstring (knee flexion)  5/5  5/5    Gastrocnemius (PF)  5/5  5/5    Tibialis Ant. (DF)  5/5  5/5    EHL  5/5  5/5      Negative Babinski bilaterally. Negative for clonus.   Calves are soft and non-tender bilaterally.     ASSESSMENT/PLAN:     Anival Moore is a 40 year old male who presents to the clinic for consultation on a constant, sharp, burning pain that initiates in the midline low lumbar region and radiates distally in what sounds like the right L5 distribution. This pain is accompanied by paresthesia, numbness and perceived weakness in the same distribution. The patient's most recent imaging was reviewed with him today. It was explained that images show normal vertebral body heights and alignment as well as normal interbody spaces and facets. On exam, he is noted to have appropriate strength, sensation and range of motion. He has attempted conservative management with a HEP, NSAIDs, and prednisone, without resolution of symptoms.     Based on his physical exam, we do feel that it would be in his best interest to obtain a lumbar MRI to further assess our concern for lumbar stenosis, a bulging / herniated disk or other concerning pathology. Once the images have been obtained she has agreed to call our office back at [phone] to further discuss possible surgical interventions or other conservative therapies.      We also discussed signs of a worsening problem that he  should seek being evaluated.        Respectfully,     TEOFILO Davila, CROW  [facility] Neurosurgery  [facility]  Tel: [phone]      Exam, imaging, and plan reviewed by Dr. Saul.

## 2021-02-19 NOTE — NURSING NOTE
Anival Moore is a 40 year old male who presents for:  Chief Complaint   Patient presents with     Consult     Acute lbp w/radiculopathy affecting right lower extremity/XR- 02/01/21, No PT, INJ, prev surg.        Initial Vitals:  BP (!) 133/92   Pulse 110   Temp 98.8  F (37.1  C)   Ht 6' (1.829 m)   Wt 166 lb (75.3 kg)   SpO2 98%   BMI 22.51 kg/m   Estimated body mass index is 22.51 kg/m  as calculated from the following:    Height as of this encounter: 6' (1.829 m).    Weight as of this encounter: 166 lb (75.3 kg).. Body surface area is 1.96 meters squared. BP completed using cuff size: regular  Mild Pain (3)    Nursing Comments: Patient presents for   Acute lbp w/radiculopathy affecting right lower extremity/XR- 02/01/21, No PT, INJ, prev surg.  Lawrence Cook MA

## 2021-02-19 NOTE — PATIENT INSTRUCTIONS
-Order placed for lumbar MR imaging WO. Please call [facility] at [phone] to schedule the date, time and location that is most convenient for you to obtain your imaging. Once the imaging has been completed, please contact my office the next day to review the images as well as treatment options. You can contact my off at [phone]        TEOFILO Davila  [facility] Neurosurgery  [facility]     Tel: [phone]  Fax: [phone]  \

## 2021-02-22 ENCOUNTER — TELEPHONE (OUTPATIENT)
Dept: NEUROSURGERY | Facility: CLINIC | Age: 41
End: 2021-02-22

## 2021-02-22 NOTE — TELEPHONE ENCOUNTER
Reason for call: Pt called to advise that he is having his MRI today at 4pm . Call if there are any questions.

## 2021-02-23 ENCOUNTER — OFFICE VISIT (OUTPATIENT)
Facility: CLINIC | Age: 41
End: 2021-02-23
Payer: MEDICAID

## 2021-02-23 DIAGNOSIS — M54.50 LUMBAR SPINE PAIN: Primary | ICD-10-CM

## 2021-02-23 NOTE — PROGRESS NOTES
"Spoke to Mr. Moore on the phone. He stated that he was going to get his MRI yesterday but was under the impression that I was going to be able to get him a \"free MRI.\" I again explained that I have no say whether or not or what his insurance will cover in regards to a lumbar MRI. He then stated that he was confused and misunderstood but was clearly upset. I apologized for the confusion and told him to contact our office if he needs any help with his insurance or has any other questions.      Mikey Guo PA-C on 2/23/2021 at 4:02 PM    "

## 2021-02-23 NOTE — TELEPHONE ENCOUNTER
Spoke with patient and advised that he call his insurance company to find out what kind of coverage he has for an MRI.      Patient has not scheduled MRI--because he would like to have a member of the care team contact him to explain the process.  There is some confusion whether about coverage for MRI.

## 2021-03-01 ENCOUNTER — HOSPITAL ENCOUNTER (OUTPATIENT)
Dept: MRI IMAGING | Facility: CLINIC | Age: 41
Discharge: HOME | End: 2021-03-01
Attending: PHYSICIAN ASSISTANT | Admitting: PHYSICIAN ASSISTANT
Payer: MEDICAID

## 2021-03-01 DIAGNOSIS — M54.16 LUMBAR RADICULOPATHY: ICD-10-CM

## 2021-03-01 PROCEDURE — 72148 MRI LUMBAR SPINE W/O DYE: CPT

## 2021-03-08 ENCOUNTER — OFFICE VISIT (OUTPATIENT)
Dept: NEUROSURGERY | Facility: CLINIC | Age: 41
End: 2021-03-08
Attending: PHYSICIAN ASSISTANT
Payer: MEDICAID

## 2021-03-08 VITALS
DIASTOLIC BLOOD PRESSURE: 89 MMHG | OXYGEN SATURATION: 96 % | WEIGHT: 169 LBS | HEART RATE: 109 BPM | HEIGHT: 72 IN | BODY MASS INDEX: 22.89 KG/M2 | TEMPERATURE: 98.8 F | SYSTOLIC BLOOD PRESSURE: 138 MMHG

## 2021-03-08 DIAGNOSIS — M54.16 LUMBAR RADICULOPATHY: Primary | ICD-10-CM

## 2021-03-08 PROCEDURE — G0463 HOSPITAL OUTPT CLINIC VISIT: HCPCS

## 2021-03-08 PROCEDURE — 99213 OFFICE O/P EST LOW 20 MIN: CPT | Performed by: PHYSICIAN ASSISTANT

## 2021-03-08 ASSESSMENT — MIFFLIN-ST. JEOR: SCORE: 1719.58

## 2021-03-08 ASSESSMENT — PAIN SCALES - GENERAL: PAINLEVEL: SEVERE PAIN (7)

## 2021-03-08 NOTE — PROGRESS NOTES
NEUROSURGERY CLINIC PROGRESS NOTE    DATE OF VISIT: 3/8/2021    HPI:     Anival Moore is a pleasant 40 year old male who presents to the clinic today for a follow-up visit. On 02/19/21 I evaluated him for a constant, sharp, burning pain that initiates in the midline low lumbar region and radiates distally in what sounds like the right L5 distribution. This pain is accompanied by paresthesia, numbness and perceived weakness in the same distribution. He had not tried physical therapy nor did he have appropriate imaging. We did obtain a lumbar MRI that shows a small central to right paramedian L4-L5 disc protrusion.   Today, the patient reports that his symptoms are unchanged.     Current Outpatient Medications   Medication     cyclobenzaprine (FLEXERIL) 5 MG tablet     venlafaxine (EFFEXOR-XR) 150 MG 24 hr capsule     predniSONE (DELTASONE) 10 MG tablet     No current facility-administered medications for this visit.        No Known Allergies    Past Medical History:   Diagnosis Date     Anxiety      Depression      Spontaneous pneumothorax        Review Of Systems    Skin: negative  Eyes: negative  Ears/Nose/Throat: negative  Respiratory: No shortness of breath, dyspnea on exertion, cough, or hemoptysis  Cardiovascular: negative  Gastrointestinal: negative  Musculoskeletal: back pain  Neurologic: numbness or tingling of feet  Psychiatric: negative  Hematologic/Lymphatic/Immunologic: negative  Endocrine: negative    OBJECTIVE:    /89   Pulse 109   Temp 98.8  F (37.1  C)   Ht 6' (1.829 m)   Wt 169 lb (76.7 kg)   SpO2 96%   BMI 22.92 kg/m      Imaging:    MR LUMBAR SPINE WITHOUT CONTRAST 3/1/2021 2:36 PM     Findings notable for:  1. Small central to right paramedian L4-L5 disc protrusion superimposed upon degenerative disc and facet disease resulting in mild-to-moderate central canal stenosis and mild-to-moderate subarticular lateral recess stenosis. This is resulting in some  impingement on the right L5  nerve root.  2. No evidence for infection or neoplasm.    Radiographic Findings: Full radiological report in chart. I personally reviewed the images with the patient today.    Exam:    Patient appears comfortable and in no apparent distress. Moving all extremities.  Gait is non-antalgic.  CN II-XII grossly intact, alert and appropriate with conversation and following  commands  Bilateral upper extremities with full strength including hand intrinsics and grasp.  Sensation intact throughout.  Bilateral lower extremities 5/5 strength including plantar and dorsiflexion.  Normal sensation throughout bilaterally.      ASSESSMENT:    1. Lumbar radiculopathy        PLAN:    Mr Moore's most recent imaging exhibits a small central to right paramedian L4-L5 disc protrusion which certainly correlate with his subjective concerns and objective findings on his physical exam. However, we do not recommend surgical intervention at this time.     Based on his physical exam, imaging review lack of past treatments, we feel that it would be in his best interest to try a conservative approach by participating in a physical therapy program to include lumbar traction. We also discussed obtaining an epidural steroid injection at the right L4-5 level which he would like to proceed with. Both of these modalities have been ordered.     We would like to see him back in six weeks if needed to further discuss possible surgical interventions or other conservative therapies. In the event that patient's symptoms worsen or change we would like to see him sooner.     We did review the images together and we explained his condition and the recommended treatment. We also discussed signs of a worsening problem that he should seek being evaluated.     Should he fail to obtain adequate alleviation of his symptoms utilizing non-operative measures, we briefly discussed a right L4-5 microdiscectomy.       The patient gave verbal understanding and is in  agreement with the above plan. He  will call or return to the clinic for any worsening or changes in symptoms.    Respectfully,     TEOFILO Hook, PADangeloC

## 2021-03-08 NOTE — PATIENT INSTRUCTIONS
-Order placed for epidural steroid injection. The steroid can take 10-14 days to reach max effect. Please call our clinic if symptoms persist after this timeframe.    You can call to schedule injection at [phone] if you do not hear from them within the next day or two.      -Order placed for physical therapy. The therapy team will contact you to schedule your appointment. Please call our clinic if symptoms persist after your course of physical therapy.      TEOIFLO Davila  [facility] Neurosurgery  [facility]     Tel: [phone]  Fax: [phone]

## 2021-03-08 NOTE — LETTER
3/8/2021         RE: Anival Moore  [address] [facility] [address]        Dear Colleague,    Thank you for referring your patient, Anival Moore, to the [facility] NEUROSURGERY [facility]INIC [place]. Please see a copy of my visit note below.    NEUROSURGERY CLINIC PROGRESS NOTE    DATE OF VISIT: 3/8/2021    HPI:     Anival Moore is a pleasant 40 year old male who presents to the clinic today for a follow-up visit. On 02/19/21 I evaluated him for a constant, sharp, burning pain that initiates in the midline low lumbar region and radiates distally in what sounds like the right L5 distribution. This pain is accompanied by paresthesia, numbness and perceived weakness in the same distribution. He had not tried physical therapy nor did he have appropriate imaging. We did obtain a lumbar MRI that shows a small central to right paramedian L4-L5 disc protrusion.   Today, the patient reports that his symptoms are unchanged.     Current Outpatient Medications   Medication     cyclobenzaprine (FLEXERIL) 5 MG tablet     venlafaxine (EFFEXOR-XR) 150 MG 24 hr capsule     predniSONE (DELTASONE) 10 MG tablet     No current facility-administered medications for this visit.        No Known Allergies    Past Medical History:   Diagnosis Date     Anxiety      Depression      Spontaneous pneumothorax        Review Of Systems    Skin: negative  Eyes: negative  Ears/Nose/Throat: negative  Respiratory: No shortness of breath, dyspnea on exertion, cough, or hemoptysis  Cardiovascular: negative  Gastrointestinal: negative  Musculoskeletal: back pain  Neurologic: numbness or tingling of feet  Psychiatric: negative  Hematologic/Lymphatic/Immunologic: negative  Endocrine: negative    OBJECTIVE:    /89   Pulse 109   Temp 98.8  F (37.1  C)   Ht 6' (1.829 m)   Wt 169 lb (76.7 kg)   SpO2 96%   BMI 22.92 kg/m      Imaging:    MR LUMBAR SPINE WITHOUT CONTRAST 3/1/2021 2:36 PM     Findings notable for:  1. Small central  to right paramedian L4-L5 disc protrusion superimposed upon degenerative disc and facet disease resulting in mild-to-moderate central canal stenosis and mild-to-moderate subarticular lateral recess stenosis. This is resulting in some  impingement on the right L5 nerve root.  2. No evidence for infection or neoplasm.    Radiographic Findings: Full radiological report in chart. I personally reviewed the images with the patient today.    Exam:    Patient appears comfortable and in no apparent distress. Moving all extremities.  Gait is non-antalgic.  CN II-XII grossly intact, alert and appropriate with conversation and following  commands  Bilateral upper extremities with full strength including hand intrinsics and grasp.  Sensation intact throughout.  Bilateral lower extremities 5/5 strength including plantar and dorsiflexion.  Normal sensation throughout bilaterally.      ASSESSMENT:    1. Lumbar radiculopathy        PLAN:    Mr Moore's most recent imaging exhibits a small central to right paramedian L4-L5 disc protrusion which certainly correlate with his subjective concerns and objective findings on his physical exam. However, we do not recommend surgical intervention at this time.     Based on his physical exam, imaging review lack of past treatments, we feel that it would be in his best interest to try a conservative approach by participating in a physical therapy program to include lumbar traction. We also discussed obtaining an epidural steroid injection at the right L4-5 level which he would like to proceed with. Both of these modalities have been ordered.     We would like to see him back in six weeks if needed to further discuss possible surgical interventions or other conservative therapies. In the event that patient's symptoms worsen or change we would like to see him sooner.     We did review the images together and we explained his condition and the recommended treatment. We also discussed signs of a  worsening problem that he should seek being evaluated.     Should he fail to obtain adequate alleviation of his symptoms utilizing non-operative measures, we briefly discussed a right L4-5 microdiscectomy.       The patient gave verbal understanding and is in agreement with the above plan. He  will call or return to the clinic for any worsening or changes in symptoms.    Respectfully,     TEOFILO Hook, CROW        Again, thank you for allowing me to participate in the care of your patient.        Sincerely,        Mikey Guo PA-C

## 2021-03-08 NOTE — NURSING NOTE
Anival Moore is a 40 year old male who presents for:  Chief Complaint   Patient presents with     Follow Up     follow up for MRI results lumbar        Initial Vitals:  /89   Pulse 109   Temp 98.8  F (37.1  C)   Ht 6' (1.829 m)   Wt 169 lb (76.7 kg)   SpO2 96%   BMI 22.92 kg/m   Estimated body mass index is 22.92 kg/m  as calculated from the following:    Height as of this encounter: 6' (1.829 m).    Weight as of this encounter: 169 lb (76.7 kg).. Body surface area is 1.97 meters squared. BP completed using cuff size: Large   Severe Pain (7)    Nursing Comments: Patient presents for follow up for MRI results lumbar    Lawrence Cook MA

## 2021-03-15 ENCOUNTER — TELEPHONE (OUTPATIENT)
Dept: NEUROSURGERY | Facility: CLINIC | Age: 41
End: 2021-03-15

## 2021-03-15 ENCOUNTER — TELEPHONE (OUTPATIENT)
Dept: PALLIATIVE MEDICINE | Facility: CLINIC | Age: 41
End: 2021-03-15

## 2021-03-15 NOTE — TELEPHONE ENCOUNTER
Reason For Call: Talked to patient to schedule 6 wk follow up appointment from 3/8/21, patient declined.

## 2021-03-15 NOTE — TELEPHONE ENCOUNTER
Screening Questions for Radiology Injections:    Injection to be done at which interventional clinic site? [facility]    If [facility] location, tell patient that this procedure requires a COVID-19 lab test be done within 4 days of the procedure. Would you still like to move forward with scheduling the procedure?  Not Applicable   If YES, let patient know that someone will call them to schedule the COVID-19 test and that they will only receive a call back if the result is positive. Route to nursing to enter order.     Instruct patient to arrive as directed prior to the scheduled appointment time:    [place] minutes before      [place]: 30 minutes before; if IV needed 1 hour before     Procedure ordered by [facility]    Procedure ordered? Right L4-5 TFESI      Transforaminal Cervical CECI - no pain provider currently performing    As a reminder, receiving steroids can decrease your body's ability to fight infection.   Would you still like to move forward with scheduling the injection?  Yes    What insurance would patient like us to bill for this procedure? Medicaid      Worker's comp or MVA (motor vehicle accident) -Any injection DO NOT SCHEDULE and route to Carmelita Rangel.      [employer] insurance - For SI joint injections, DO NOT SCHEDULE and route Carmelita Rangel.       ALL BCBS, [employer] and [employer]-Route to Carmelita for review DO NOT SCHEDULE      IF SCHEDULING IN [place] AND NEEDS A PA, IT IS OKAY TO SCHEDULE. [place] HANDLES THEIR OWN PA'S AFTER THE PATIENT IS SCHEDULED. PLEASE SCHEDULE AT LEAST 1 WEEK OUT SO A PA CAN BE OBTAINED.    Any chance of pregnancy? Not Applicable   If YES, do NOT schedule and route to RN pool    Is an  needed? No     Patient has a drive home? (mandatory) YES: INFORMED    Is patient taking any blood thinners (i.e. plavix, coumadin, jantoven, warfarin, heparin, pradaxa or dabigatran, etc)? No   If hold needed, do NOT schedule, route to RN pool     Is  patient taking any aspirin products (includes Excedrin and Fiorinal)? No     If more than 325mg/day, OK to schedule; Instruct pt to decrease to less than 325 mg for 7 days AND route to RN pool    For CERVICAL procedures, hold all aspirin products for 6 days.     Tell pt that if aspirin product is not held for 6 days, the procedure WILL BE cancelled.      Does the patient have a bleeding or clotting disorder? No     If YES, okay to schedule AND route to RN nurse pool    For any patients with platelet count <100, must be forwarded to provider    Is patient diabetic?  No  If YES, instruct them to bring their glucometer.    Does patient have an active infection or treated for one within the past week? No     Is patient currently taking any antibiotics?  No     For patients on chronic, preventative, or prophylactic antibiotics, procedures may be scheduled.     For patients on antibiotics for active or recent infection:antibiotic course must have been completed for 4 days    Is patient currently taking any steroid medications? (i.e. Prednisone, Medrol)  No     For patients on steroid medications, course must have been completed for 4 days    Is patient actively being treated for cancer or immunocompromised? No  If YES, do NOT schedule and route to RN pool     Are you able to get on and off an exam table with minimal or no assistance? Yes  If NO, do NOT schedule and route to RN pool    Are you able to roll over and lay on your stomach with minimal or no assistance? Yes  If NO, do NOT schedule and route to RN pool     Any allergies to contrast dye, iodine, shellfish, or numbing and steroid medications? No  If YES, route to RN pool AND add allergy information to appointment notes    Allergies: Patient has no known allergies.      Has the patient had a flu shot or any other vaccinations within 7 days before or after the procedure.  No     Have you recently had a COVID vaccine or have plans to get it in the near future?   If  yes, explain that for the vaccine to work best they need to:       wait 1 week before and 1 week after getting Vaccine #1    wait 1 week before and 2 weeks after getting Vaccine #2    If patient has concerns about the timing, send to RN pool     Does patient have an MRI/CT?  YES: 2021  Check Procedure Scheduling Grid to see if required.      Was the MRI done within the last 3 years?  Yes    If yes, where was the MRI done i.e.[facility], [facility], [facility], [facility] etc? [facility]      If no, do not schedule and route to RN pool    If MRI was not done at [facility], [facility] or [facility] do NOT schedule and route to RN pool.      If pt has an imaging disc, the injection MAY be scheduled but pt has to bring disc to appt.     If they show up without the disc the injection cannot be done    Procedure Specific Instructions:      If celiac plexus block, informed patient NPO for 6 hours and that it is okay to take medications with sips of water, especially blood pressure medications  Not Applicable         If this is for a cervical procedure, informed patient that aspirin needs to be held for 6 days.   Not Applicable      If IV needed:    Do not schedule procedures requiring IV placement in the first appointment of the day or first appointment after lunch. Do NOT schedule at 0745, 0815 or 1245.     Instructed pt to arrive 30 minutes early for IV start if required. (Check Procedure Scheduling Grid)  Not Applicable    Reminders:      If you are started on any steroids or antibiotics between now and your appointment, you must contact us because the procedure may need to be cancelled.  Yes      For all procedures except radiofrequency ablations (RFAs) and spinal cord stimulator (SCS) trials, informed patient:    IV sedation is not provided for this procedure.  If you feel that an oral anti-anxiety medication is needed, you can discuss this further with your referring provider or primary care provider.  The Pain Clinic  provider will discuss specifics of what the procedure includes at your appointment.  Most procedures last 10-20 minutes.  We use numbing medications to help with any discomfort during the procedure.  Not Applicable      For patients 85 or older we recommend having an adult stay w/ them for the remainder of the day.       Does the patient have any questions?  NO  Miriam Thrasher  [facility]

## 2021-03-16 ENCOUNTER — VIRTUAL VISIT (OUTPATIENT)
Dept: FAMILY MEDICINE | Facility: CLINIC | Age: 41
End: 2021-03-16
Payer: MEDICAID

## 2021-03-16 DIAGNOSIS — M54.50 LUMBAR BACK PAIN: ICD-10-CM

## 2021-03-16 DIAGNOSIS — R44.3 HALLUCINATIONS: Primary | ICD-10-CM

## 2021-03-16 PROCEDURE — 99441 PR PHYSICIAN TELEPHONE EVALUATION 5-10 MIN: CPT | Performed by: FAMILY MEDICINE

## 2021-03-16 RX ORDER — CYCLOBENZAPRINE HCL 5 MG
5 TABLET ORAL 2 TIMES DAILY PRN
Qty: 30 TABLET | Refills: 0 | Status: SHIPPED | OUTPATIENT
Start: 2021-03-16 | End: 2021-06-14

## 2021-03-16 ASSESSMENT — ANXIETY QUESTIONNAIRES
3. WORRYING TOO MUCH ABOUT DIFFERENT THINGS: NOT AT ALL
GAD7 TOTAL SCORE: 7
1. FEELING NERVOUS, ANXIOUS, OR ON EDGE: SEVERAL DAYS
2. NOT BEING ABLE TO STOP OR CONTROL WORRYING: NOT AT ALL
IF YOU CHECKED OFF ANY PROBLEMS ON THIS QUESTIONNAIRE, HOW DIFFICULT HAVE THESE PROBLEMS MADE IT FOR YOU TO DO YOUR WORK, TAKE CARE OF THINGS AT HOME, OR GET ALONG WITH OTHER PEOPLE: EXTREMELY DIFFICULT
7. FEELING AFRAID AS IF SOMETHING AWFUL MIGHT HAPPEN: NOT AT ALL
5. BEING SO RESTLESS THAT IT IS HARD TO SIT STILL: SEVERAL DAYS
6. BECOMING EASILY ANNOYED OR IRRITABLE: MORE THAN HALF THE DAYS

## 2021-03-16 ASSESSMENT — ENCOUNTER SYMPTOMS
PARESTHESIAS: 1
HALLUCINATIONS: 1
FEVER: 0
ABDOMINAL PAIN: 0
COUGH: 0
TREMORS: 0
WEAKNESS: 0
BACK PAIN: 1
NERVOUS/ANXIOUS: 1
FATIGUE: 1
ARTHRALGIAS: 1
DYSPHORIC MOOD: 1

## 2021-03-16 ASSESSMENT — PATIENT HEALTH QUESTIONNAIRE - PHQ9
5. POOR APPETITE OR OVEREATING: NEARLY EVERY DAY
SUM OF ALL RESPONSES TO PHQ QUESTIONS 1-9: 11

## 2021-03-16 NOTE — PROGRESS NOTES
Anival is a 40 year old who is being evaluated via a billable telephone visit.      What phone number would you like to be contacted at? [phone]  How would you like to obtain your AVS? Mail a copy    Assessment & Plan     Patient schedule telephone visit to discuss further about his hallucination, lumbar back pain.  Patient describe he is experiencing excruciating pain in his lower back, he is getting only few hours of sleep due to excruciating pain in his lower back.    Patient describe his wife has noticed he is hallucinating,patient added his mood change is from poor sleep , low mood and hallucination .    Reviewed patient returned patient was evaluated by neurosurgery for his lumbar back pain, patient is scheduled for spinal injection on Friday, patient is also scheduled for physical therapy.    Patient does express he is unhappy with  treatment primary surgery is getting delayed.    -  Hallucinations  For hallucination altered  mood I did offer mental health referral to further evaluation .  Patient discontinued our phone call and deferred  any mental health referral.    -We will have our pal 4  reach out to patient and see if he is interested in pursuing psychiatry consultation or counseling,  Different medication options are also available, we were unable to discuss options as patient discontinued phone visit .    - will offer close clinic follow up in 2 weeks .      I was unable to complete our medication check as patient discontinued telephone visit.    Lumbar back pain   -Patient describe he has reached to the point that he want to take heroine over the street due to his increased lumbar back pain.   Explained I do not recommend Heroin use  Explained potential side effect and addiction .     -Patient scheduled for  Lumbar spinal injection on Friday.    -Physical therapy scheduled for patient.  We discussed Tylenol, ibuprofen.    -We discussed lidocaine patch  -Flexeril for muscle cramps prescribed.  -  cyclobenzaprine (FLEXERIL) 5 MG tablet; Take 1 tablet (5 mg) by mouth 2 times daily as needed for muscle spasms      Depression Screening Follow Up    PHQ 3/16/2021   PHQ-9 Total Score 11   Q9: Thoughts of better off dead/self-harm past 2 weeks Several days       No follow-ups on file.    Cait Orourke MD  [facility] ANDREW Florian is a 40 year old who presents for the following health issues     HPI     Depression and Anxiety Follow-Up    How are you doing with your depression since your last visit? Worsened     How are you doing with your anxiety since your last visit?  Worsened     Are you having other symptoms that might be associated with depression or anxiety? Yes:  sleep    Have you had a significant life event? No     Do you have any concerns with your use of alcohol or other drugs? No    Social History     Tobacco Use     Smoking status: Never Smoker     Smokeless tobacco: Never Used   Substance Use Topics     Alcohol use: Yes     Alcohol/week: 0.0 standard drinks     Comment: occasional     Drug use: No     PHQ 2/1/2021 3/16/2021   PHQ-9 Total Score 5 11   Q9: Thoughts of better off dead/self-harm past 2 weeks Not at all Several days     GAB-7 SCORE 3/16/2021   Total Score 7         Suicide Assessment Five-step Evaluation and Treatment (SAFE-T)      How many servings of fruits and vegetables do you eat daily?  0-1    On average, how many sweetened beverages do you drink each day (Examples: soda, juice, sweet tea, etc.  Do NOT count diet or artificially sweetened beverages)?   3    How many days per week do you exercise enough to make your heart beat faster? 0    How many minutes a day do you exercise enough to make your heart beat faster? 9 or less    How many days per week do you miss taking your medication? 0        Review of Systems   Constitutional: Positive for fatigue. Negative for fever.   Respiratory: Negative for cough.    Cardiovascular: Negative for chest pain and  peripheral edema.   Gastrointestinal: Negative for abdominal pain.   Musculoskeletal: Positive for arthralgias, back pain and gait problem.   Neurological: Positive for paresthesias. Negative for tremors and weakness.   Psychiatric/Behavioral: Positive for dysphoric mood, hallucinations and mood changes. The patient is nervous/anxious.             Objective           Vitals:  No vitals were obtained today due to virtual visit.    Physical Exam   healthy, alert and no distress    PSYCH: Alert and oriented times 3; coherent speech, normal   rate and volume, able to articulate logical thoughts, able   to abstract reason, no tangential thoughts, no hallucinations   or delusions  His affect is normal    RESP: No cough, no audible wheezing, able to talk in full sentences  Remainder of exam unable to be completed due to telephone visits                Phone call duration: 10  minutes

## 2021-03-17 ASSESSMENT — ANXIETY QUESTIONNAIRES: GAD7 TOTAL SCORE: 7

## 2021-03-18 ENCOUNTER — TELEPHONE (OUTPATIENT)
Dept: PALLIATIVE MEDICINE | Facility: CLINIC | Age: 41
End: 2021-03-18

## 2021-03-18 NOTE — TELEPHONE ENCOUNTER
Spoke to patient, reminded patient of date, time and location of appointment.      Reminded patient to eat and drink as usual, hold any blood thinners or pain medications that they were asked to hold per nurse.     Reminded patient they will need a  for this appointment.      Reminded patient that both patient and  must wear a mask during their visit.        Anne Mancia MA  [facility]

## 2021-03-25 ENCOUNTER — TELEPHONE (OUTPATIENT)
Dept: PALLIATIVE MEDICINE | Facility: CLINIC | Age: 41
End: 2021-03-25

## 2021-03-25 NOTE — TELEPHONE ENCOUNTER
Spoke to patient, reminded patient of date, time and location of appointment.      Reminded patient to eat and drink as usual, hold any blood thinners or pain medications that they were asked to hold per nurse.     Reminded patient they will need a  for this appointment.      Reminded patient that both patient and  must wear a mask during their visit.        Anne Mancia MA  [facility]

## 2021-03-30 ENCOUNTER — THERAPY VISIT (OUTPATIENT)
Dept: PHYSICAL THERAPY | Facility: CLINIC | Age: 41
End: 2021-03-30
Payer: MEDICAID

## 2021-03-30 DIAGNOSIS — M54.16 RIGHT LUMBAR RADICULOPATHY: ICD-10-CM

## 2021-03-30 PROCEDURE — 97161 PT EVAL LOW COMPLEX 20 MIN: CPT | Mod: GP | Performed by: PHYSICAL THERAPIST

## 2021-03-30 PROCEDURE — 97110 THERAPEUTIC EXERCISES: CPT | Mod: GP | Performed by: PHYSICAL THERAPIST

## 2021-03-30 PROCEDURE — 97012 MECHANICAL TRACTION THERAPY: CPT | Mod: GP | Performed by: PHYSICAL THERAPIST

## 2021-03-30 NOTE — PROGRESS NOTES
Physical Therapy Initial Evaluation  Subjective:    Therapist Generated HPI Evaluation  Problem details: Patient has chief complaint of low back pain which radiates into right lower extremity which started November 2020 (MD orders 3/8/2021)after long car ride (2 hours)  Pain is 6/10 PL and is unrelenting throughout the day. Pain radiates from right lumbar into right buttock, thigh lateral calf into foot and is constant. He can only walk short distances with use of a straight cane. He also reports numbness and tingling into right L5 dermatome. He has not had any previous treatment for it due to no insurance and Covid-19. He is scheduled for a steroid injection on 4/2/2021 and has been told he needs to do PT pelvic traction trial prior to authorization for insurance to cover surgery.     Findings MRI Lumbar spine  1. Small central to right paramedian L4-L5 disc protrusion superimposed upon degenerative disc and facet disease resulting in mild-to-moderate central canal stenosis and mild-to-moderate subarticular lateral recess stenosis. This is resulting in some impingement on the right L5 nerve root..         Type of problem:  Lumbar.    This is a chronic condition.  Condition occurred with:  Other reason (prolonged sitting in car).    Patient reports pain:  Lumbar spine right.  Pain is described as aching, burning, cramping, shooting and sharp and is constant.  Pain radiates to:  Gluteals right, thigh right, lower leg right and foot right. Pain is the same all the time.  Since onset symptoms are unchanged.  Associated symptoms:  Loss of motion, loss of strength, numbness and tingling. Symptoms are exacerbated by bending, standing, lifting, walking, lying down, sitting and carrying  and relieved by nothing.    Past treatment: none.   Work activity restrictions: unemployed.  Barriers include:  None as reported by patient.    Patient Health History  Anival JULIANNE Moore being seen for Right L5 radiculopathy: insurance requires  PT prior to approval for spine surgery.     Date of Onset: November 2020.   Problem occurred: Unsure   Pain is reported as 6/10 on pain scale.  General health as reported by patient is fair.  Pertinent medical history includes: depression and numbness/tingling.   Red flags:  Calf pain-swelling-warmth and pain at rest/night.  Medical allergies: none.   Surgeries include:  None.    Current medications:  Anti-depressants and muscle relaxants.    Current occupation is Unemployed.                                       Objective:  Standing Alignment:        Lumbar:  Lateral shift L            Gait:    Gait Type:  Antalgic   Assistive Devices:  Cane      Flexibility/Screens:       Lower Extremity:  Decreased left lower extremity flexibility:Hamstrings and Gastroc    Decreased right lower extremity flexibility:  Hamstrings and Gastroc               Lumbar/SI Evaluation  ROM:    AROM Lumbar:   Flexion:            40% pain  Ext:                    10% pain   Side Bend:        Left:  30% pain    Right:  25% pain  Rotation:           Left:  40%    Right:  40%  Side Glide:        Left:     Right:           Lumbar Myotomes:  Lumbar myotomes: right L/E strength limited by pain.  T12-L3 (Hip Flex):  Left: 4+    Right: 4-  L2-4 (Quads):  Left:  4+    Right:  3+  L4 (Ankle DF):  Left:  4+    Right:  4-  L5 (Great Toe Ext): Left: 4+      S1 (Toe Raise):  Left: 4+    Right: 3-      Lumbar Dermtomes:                S1 Right:  Hypo-light touch  Neural Tension/Mobility:      Right side:   SLR w/DF and SLR positive.  Lumbar Palpation:    Tenderness present at Left:    Vertebral  Tenderness present at Right: Quadratus Lumborum; Erector Spinae; Piriformis; PSIS and Vertebral  Functional Tests:  not assessed        Lumbar Provocation:  not assessed                                                 Supine intermittent pelvic traction trial not tolerated and discontinued after 5 minutes.    General     ROS    Assessment/Plan:    Patient is a 40  year old male with lumbar complaints.    Patient has the following significant findings with corresponding treatment plan.                Diagnosis 1:  Right L5 radiculopathy  Pain -  mechanical traction and self management  Decreased ROM/flexibility - manual therapy, therapeutic exercise and home program  Decreased strength - therapeutic exercise, therapeutic activities and home program  Impaired gait - gait training    Therapy Evaluation Codes:   1) History comprised of:   Personal factors that impact the plan of care:      None.    Comorbidity factors that impact the plan of care are:      None.     Medications impacting care: None.  2) Examination of Body Systems comprised of:   Body structures and functions that impact the plan of care:      Lumbar spine.   Activity limitations that impact the plan of care are:      Bending, Sitting, Standing and Walking.  3) Clinical presentation characteristics are:   Stable/Uncomplicated.  4) Decision-Making    Low complexity using standardized patient assessment instrument and/or measureable assessment of functional outcome.  Cumulative Therapy Evaluation is: Low complexity.    Previous and current functional limitations:  (See Goal Flow Sheet for this information)    Short term and Long term goals: (See Goal Flow Sheet for this information)     Communication ability:  Patient appears to be able to clearly communicate and understand verbal and written communication and follow directions correctly.  Treatment Explanation - The following has been discussed with the patient:   RX ordered/plan of care  Anticipated outcomes  Possible risks and side effects  This patient would benefit from PT intervention to resume normal activities.   Rehab potential is fair.    Frequency:  2 X week, once daily  Duration:  for 3 weeks  Discharge Plan:  Achieve all LTG.  Independent in home treatment program.  Reach maximal therapeutic benefit.    Please refer to the daily flowsheet for treatment  today, total treatment time and time spent performing 1:1 timed codes.

## 2021-03-30 NOTE — LETTER
DEPARTMENT OF HEALTH AND HUMAN SERVICES  CENTERS FOR MEDICARE & MEDICAID SERVICES    PLAN/UPDATED PLAN OF PROGRESS FOR OUTPATIENT REHABILITATION          PATIENTS NAME:  Anival Moore   : 1980  PR[facility] NUMBER:    [id number]  [id number]      PROVIDER NAME: Psychiatric  MEDICAL RECORD NUMBER: [id number]   START OF CARE DATE:  SOC Date: 21   TYPE:  PT    PRIMARY/TREATMENT DIAGNOSIS: (Pertinent Medical Diagnosis)  Right lumbar radiculopathy    VISITS FROM START OF CARE:  Rxs Used: 1     Physical Therapy Initial Evaluation  Subjective:  Therapist Generated HPI Evaluation  Problem details: Patient has chief complaint of low back pain which radiates into right lower extremity which started 2020 (MD orders 3/8/2021)after long car ride (2 hours)  Pain is 6/10 PL and is unrelenting throughout the day. Pain radiates from right lumbar into right buttock, thigh lateral calf into foot and is constant. He can only walk short distances with use of a straight cane. He also reports numbness and tingling into right L5 dermatome. He has not had any previous treatment for it due to no insurance and Covid-19. He is scheduled for a steroid injection on 2021 and has been told he needs to do PT pelvic traction trial prior to authorization for insurance to cover surgery.  Findings MRI Lumbar spine  1. Small central to right paramedian L4-L5 disc protrusion superimposed upon degenerative disc and facet disease resulting in mild-to-moderate central canal stenosis and mild-to-moderate subarticular lateral recess stenosis. This is resulting in some impingement on the right L5 nerve root..         Type of problem:  Lumbar.  This is a chronic condition.  Condition occurred with:  Other reason (prolonged sitting in car).  Patient reports pain:  Lumbar spine right.  Pain is described as aching, burning, cramping, shooting and sharp and is constant.  Pain radiates to:  Gluteals  right, thigh right, lower leg right and foot right. Pain is the same all the time.  Since onset symptoms are unchanged.  Associated symptoms:  Loss of motion, loss of strength, numbness and tingling. Symptoms are exacerbated by bending, standing, lifting, walking, lying down, sitting and carrying  and relieved by nothing.  Past treatment: none.   Work activity restrictions: unemployed.  Barriers include:  None as reported by patient.  Patient Health History  Anival Moore being seen for Right L5 radiculopathy: insurance requires PT prior to   PATIENTS NAME:  Anival Moore   : 1980    approval for spine surgery.   Date of Onset: 2020.   Problem occurred: Unsure   Pain is reported as 6/10 on pain scale.  General health as reported by patient is fair.  Pertinent medical history includes: depression and numbness/tingling.   Red flags:  Calf pain-swelling-warmth and pain at rest/night.  Medical allergies: none.   Surgeries include:  None.    Current medications:  Anti-depressants and muscle relaxants.    Current occupation is Unemployed.   Objective:  Standing Alignment:    Lumbar:  Lateral shift L  Gait:    Gait Type:  Antalgic   Assistive Devices:  Cane  Flexibility/Screens:   Lower Extremity:  Decreased left lower extremity flexibility:Hamstrings and Gastroc  Decreased right lower extremity flexibility:  Hamstrings and Gastroc   Lumbar/SI Evaluation  ROM:    AROM Lumbar:   Flexion:            40% pain  Ext:                    10% pain   Side Bend:        Left:  30% pain    Right:  25% pain  Rotation:           Left:  40%    Right:  40%  Side Glide:        Left:     Right:   Lumbar Myotomes:  Lumbar myotomes: right L/E strength limited by pain.  T12-L3 (Hip Flex):  Left: 4+    Right: 4-  L2-4 (Quads):  Left:  4+    Right:  3+  L4 (Ankle DF):  Left:  4+    Right:  4-  L5 (Great Toe Ext): Left: 4+      S1 (Toe Raise):  Left: 4+    Right: 3-  Lumbar Dermtomes:    S1 Right:  Hypo-light touch  Neural  Tension/Mobility:    Right side:   SLR w/DF and SLR positive.  Lumbar Palpation:    Tenderness present at Left:    Vertebral  Tenderness present at Right: Quadratus Lumborum; Erector Spinae; Piriformis; PSIS and Vertebral  Functional Tests:  not assessed  Lumbar Provocation:  not assessed  Supine intermittent pelvic traction trial not tolerated and discontinued after 5 minutes.  Assessment/Plan:    Patient is a 40 year old male with lumbar complaints.    Patient has the following significant findings with corresponding treatment plan.                Diagnosis 1:  Right L5 radiculopathy  Pain -  mechanical traction and self management  Decreased ROM/flexibility - manual therapy, therapeutic exercise and home program  Decreased strength - therapeutic exercise, therapeutic activities and home program  PATIENTS NAME:  Anival Moore   : 1980          Impaired gait - gait training  Therapy Evaluation Codes:   1) History comprised of:   Personal factors that impact the plan of care:      None.    Comorbidity factors that impact the plan of care are:      None.     Medications impacting care: None.  2) Examination of Body Systems comprised of:   Body structures and functions that impact the plan of care:      Lumbar spine.   Activity limitations that impact the plan of care are:      Bending, Sitting, Standing and Walking.  3) Clinical presentation characteristics are:   Stable/Uncomplicated.  4) Decision-Making    Low complexity using standardized patient assessment instrument and/or measureable assessment of functional outcome.  Cumulative Therapy Evaluation is: Low complexity.  Previous and current functional limitations:  (See Goal Flow Sheet for this information)    Short term and Long term goals: (See Goal Flow Sheet for this information)   Communication ability:  Patient appears to be able to clearly communicate and understand verbal and written communication and follow directions correctly.  Treatment  "Explanation - The following has been discussed with the patient:   RX ordered/plan of care  Anticipated outcomes  Possible risks and side effects  This patient would benefit from PT intervention to resume normal activities.   Rehab potential is fair.  Frequency:  2 X week, once daily  Duration:  for 3 weeks  Discharge Plan:  Achieve all LTG.  Independent in home treatment program.  Reach maximal therapeutic benefit.                                          PATIENTS NAME:  Anival Moore   : 1980                                    Caregiver Signature/Credentials _____________________________ Date ________       Treating Provider: Lorin Kelley,PT     I have reviewed and certified the need for these services and plan of treatment while under my care.        PHYSICIAN'S SIGNATURE:   _____________________________________  Date___________                           Mikey Guo PA-C    Certification period:  Beginning of Cert date period: 21 to  End of Cert period date: 21     Functional Level Progress Report: Please see attached \"Goal Flow sheet for Functional level.\"    ____X____ Continue Services or       ________ DC Services                Service dates: From  SOC Date: 21 date to present                         "

## 2021-04-19 ENCOUNTER — RADIOLOGY INJECTION OFFICE VISIT (OUTPATIENT)
Dept: PALLIATIVE MEDICINE | Facility: CLINIC | Age: 41
End: 2021-04-19
Payer: MEDICAID

## 2021-04-19 VITALS — OXYGEN SATURATION: 97 % | DIASTOLIC BLOOD PRESSURE: 87 MMHG | HEART RATE: 80 BPM | SYSTOLIC BLOOD PRESSURE: 126 MMHG

## 2021-04-19 DIAGNOSIS — M54.16 LUMBAR RADICULOPATHY: Primary | ICD-10-CM

## 2021-04-19 PROCEDURE — 64483 NJX AA&/STRD TFRM EPI L/S 1: CPT | Mod: RT | Performed by: PHYSICAL MEDICINE & REHABILITATION

## 2021-04-19 NOTE — NURSING NOTE
Pre-procedure Intake    Have you been fasting? NA    If yes, for how long?     Are you taking a prescribed blood thinner such as coumadin, Plavix, Xarelto?    No    If yes, when did you take your last dose?     Do you take aspirin?  No    If cervical procedure, have you held aspirin for 6 days?   NA    Do you have any allergies to contrast dye, iodine, steroid and/or numbing medications?  NO    Are you currently taking antibiotics or have an active infection?  NO    Have you had a fever/elevated temperature within the past week? NO    Are you currently taking oral steroids? NO    Do you have a ? Yes       Are you pregnant or breastfeeding?  Not Applicable    Have you received the COVID-19 vaccine? No        If yes, was it your 1st, 2nd or only dose needed?     Date of most recent vaccine:     Are the vital signs normal?  Yes

## 2021-04-19 NOTE — PATIENT INSTRUCTIONS
[facility] Procedure Discharge Instructions    Today you saw:    Dr. Yennifer Castro     Your procedure:  Epidural steroid injection      Medications used:  Lidocaine (anesthetic)   Kenalog (steroid) Normal Saline, Omnipaque (contrast)             Be cautious when walking as numbness and/or weakness in the legs may occur up to 6-8 hours after the procedure due to effect of the local anesthetic    Do not drive for 6 hours. The effect of the local anesthetic could slow your reflexes.     Avoid strenuous activity for the first 24 hours. You may resume your regular activities after that.     You may shower, however avoid swimming, tub baths or hot tubs for 24 hours following your procedure    You may have a mild to moderate increase in pain for several days following the injection.      You may use ice packs for 10-15 minutes, 3 to 4 times a day at the injection site for comfort    Do not use heat to painful areas for 6 to 8 hours. This will give the local anesthetic time to wear off and prevent you from accidentally burning your skin.    Unless you have been directed to avoid the use of anti-inflammatory medications (NSAIDS-ibuprofen, Aleve, Motrin), you may use these medications or Tylenol for pain control if needed.     With diabetes, check your blood sugar more frequently than usual as your blood sugar may be higher than normal for 10-14 days following a steroid injection. Contact your doctor who manages your diabetes if your blood sugar is higher than usual    Possible side effects of steroids that you may experience include flushing, elevated blood pressure, increased appetite, mild headaches and restlessness.  All of these symptoms will get better with time.    It may take up to 14 days for the steroid medication to start working although you may feel the effect as early as a few days after the procedure.     Follow up with your referring provider in 2-3 weeks      If you experience any of the  following, call the pain center line during work hours at [phone] or on-call physician after hours at [phone]:  -Fever over 100 degree F  -Swelling, bleeding, redness, drainage, warmth at the injection site  -Progressive weakness or numbness in your legs   -Loss of bowel or bladder function  -Unusual headache that is not relieved by Tylenol or your regular headache medication  -Unusual new onset of pain that is not improving

## 2021-04-19 NOTE — PROGRESS NOTES
[facility] - Procedure Note    Date of Service: 4/19/2021    Procedure performed: Right L4-5 transforaminal epidural steroid injection with fluoroscopic guidance  Diagnosis: Lumbar spondylosis; Lumbar radiculitis/radiculopathy  : Yennifer Castro MD   Anesthesia: None    Indications: Anival Moore is a 40 year old male who is seen at the request of Mikey Guo PA-C for lumbar transforaminal epidural steroid injection. The patient describes low back pain with radiation into the right lower extremity. The patient has been exhibiting symptoms consistent with lumbar intraspinal inflammation and radiculopathy. Symptoms have been persistent, disabling, and intermittently severe. The patient reports minimal improvement with conservative treatment, including oral medications.    Lumbar MRI was done on 3/1/2021 which showed:  FINDINGS: Five lumbar-type vertebral bodies are presumed. Posterior  alignment is normal. Vertebral body heights are maintained. Bone  marrow signal intensity is normal. The conus medullaris is normal in  appearance with its  tip at the L1 level. Paraspinal soft tissues and  visualized bony pelvis are normal.     T12-L1: Normal disc. Mild facet hypertrophy. No stenosis.     L1-L2: Broad-based disc bulging with a right paramedian to  posterolateral disc protrusion is present along with mild facet  hypertrophy. There is no significant stenosis.     L2-L3: Normal disc. Mild facet hypertrophy. No stenosis.     L3-L4: Normal disc. Mild facet hypertrophy. No stenosis.     L4-L5: There is broad-based disc bulging with a more focal central to  right paramedian disc protrusion and mild-to-moderate facet and  ligamentum flavum hypertrophy. There is mild-to-moderate central canal  stenosis, mild-to-moderate right-sided subarticular lateral recess  stenosis, but no significant neural foraminal stenosis. This is  producing some impingement upon the right L5 nerve  root.     L5-S1: This is a rudimentary disc. There is no stenosis. Facet joints  are within normal limits.                                                                      IMPRESSION:  1. Small central to right paramedian L4-L5 disc protrusion  superimposed upon degenerative disc and facet disease resulting in  mild-to-moderate central canal stenosis and mild-to-moderate  subarticular lateral recess stenosis. This is resulting in some  impingement on the right L5 nerve root.  2. No evidence for infection or neoplasm.    Allergies:    No Known Allergies     Vitals:  /82   Pulse 71   SpO2 96%     Review of Systems: The patient denies recent fever, chills, illness, use of antibiotics or anticoagulants. All other 10-point review of systems negative.     Procedure: The procedure and risks were explained, and informed written consent was obtained from the patient. Risks include but are not limited to: infection, bleeding, increased pain, and damage to soft tissue, nerve, muscle, and vasculature structures. After getting informed consent, patient was brought into the procedure suite and was placed in a prone position on the procedure table. A Pause for the Cause was performed. Patient was prepped and draped in sterile fashion.     After identifying the right L4-5 neuroforamen, the C-arm was rotated to a right lateral oblique angle.  A total of 3 mL of Lidocaine 1% was used to anesthetize the skin and the needle track at a skin entry site coaxial with the fluoroscopy beam, and overriding the superior aspect of the neuroforamen.  A 22 gauge 5 inch spinal needle was advanced under intermittent fluoroscopy until it entered the foramen superiorly.    The position was then inspected from anteroposterior and lateral views, and the needle adjusted appropriately.  After negative aspiration, a total of 1 mL of Omnipaque-300 was injected using static and continuous fluoroscopy confirming appropriate position, with spread  along the nerve root sheath and into the epidural space, with no intravascular or intrathecal uptake. 9 mL of Omnipaque-300 was wasted.    2 mL of 1% lidocaine with 10 mg of dexamethasone was injected.  The needle was removed. Hemostasis was achieved, the area was cleaned, and bandaids were placed when appropriate. Images were saved to PACS.    The patient tolerated the procedure well, and was taken to the recovery room, and there was no evidence of procedural complications. No new sensory or motor deficits were noted following the procedure. The patient was stable and able to ambulate on discharge home. Post-procedure instructions were provided.     Pre-procedure pain score: 3/10 in the back, 4/10 in the leg  Post-procedure pain score: 1/10 in the back, 1/10 in the leg    Assessment/Plan: Anival Moore is a 40 year old male s/p right L4-5 transforaminal epidural steroid injection today for lumbar spondylosis, radiculitis/radiculopathy.     1. Following today's procedure, the patient was advised to contact the [facility] for any of the following:   Fever, chills, or night sweats   New onset of pain, numbness, or weakness   Any questions/concerns regarding the procedure  If unable to contact the Pain Center, the patient was instructed to go to a local Emergency Room for any complications.   2. The patient should follow-up with the referring provider in 2 weeks for post-procedure evaluation.      MD MARQUITA [facility] [place]

## 2021-04-19 NOTE — NURSING NOTE
Discharge Information    IV Discontiued Time:  NA    Amount of Fluid Infused:  NA    Discharge Criteria = When patient returns to baseline or as per MD order    Consciousness:  Pt is fully awake    Circulation:  BP +/- 20% of pre-procedure level    Respiration:  Patient is able to breathe deeply    O2 Sat:  Patient is able to maintain O2 Sat >92% on room air    Activity:  Moves 4 extremities on command    Ambulation:  Patient is able to stand and walk or stand and pivot into wheelchair    Dressing:  Clean/dry or No Dressing    Notes:   Discharge instructions and AVS given to patient    Patient meets criteria for discharge?  YES    Admitted to PCU?  No    Responsible adult present to accompany patient home?  Yes    Signature/Title:    Maria Antonia Aguilar RN  RN Care Coordinator  [facility]  \

## 2021-04-20 ENCOUNTER — THERAPY VISIT (OUTPATIENT)
Dept: PHYSICAL THERAPY | Facility: CLINIC | Age: 41
End: 2021-04-20
Payer: MEDICAID

## 2021-04-20 DIAGNOSIS — M54.16 RIGHT LUMBAR RADICULOPATHY: ICD-10-CM

## 2021-04-20 PROCEDURE — 97110 THERAPEUTIC EXERCISES: CPT | Mod: GP | Performed by: PHYSICAL THERAPIST

## 2021-04-20 PROCEDURE — 97112 NEUROMUSCULAR REEDUCATION: CPT | Mod: GP | Performed by: PHYSICAL THERAPIST

## 2021-04-20 NOTE — PROGRESS NOTES
Subjective:  HPI  Physical Exam                    Objective:  System    Physical Exam    General     ROS    Assessment/Plan:    PROGRESS  REPORT    Progress reporting period is from 3/30/2021 to 4/20/2021.     SUBJECTIVE  Subjective: Patient reports having three days of severe pain after pelvic traction trial last session. He received a steroid injection yesterday with some relief of leg and back pain.    Current Pain level: 3/10   Initial Pain level: 6/10   Changes in function: Yes, see goal flow sheet for change in function   Adverse reactions: Treatment:; Adverse reaction treatment: pelvic traction flared x 3 days       OBJECTIVE    Objective: Patient has improved gait and transitional movements after steroid injection. Traction deferred due to severe flare up last session. Patient was instructed in gentle stretching exercises and tolerated well.       ASSESSMENT/PLAN  Updated problem list and treatment plan: Diagnosis 1:  Lumbar radiculopathy  Pain -  education  Decreased ROM/flexibility - therapeutic exercise and home program  Decreased strength - therapeutic exercise, therapeutic activities and home program    STG/LTGs have been met or progress has been made towards goals:  Yes (See Goal flow sheet completed today.)  Assessment of Progress: The patient's condition is improving.  Self Management Plans:  Patient has been instructed in a home treatment program.  Patient  has been instructed in self management of symptoms.  I have re-evaluated this patient and find that the nature, scope, duration and intensity of the therapy is appropriate for the medical condition of the patient.  Anival continues to require the following intervention to meet STG and LTG's: PT  The patient is returning to your office for a recheck appointment.    Recommendations:  This patient would benefit from continued therapy.     Frequency:  1 more session for exercise progression      Please refer to the daily flowsheet for treatment  today, total treatment time and time spent performing 1:1 timed codes.

## 2021-05-19 ENCOUNTER — THERAPY VISIT (OUTPATIENT)
Dept: PHYSICAL THERAPY | Facility: CLINIC | Age: 41
End: 2021-05-19
Payer: COMMERCIAL

## 2021-05-19 DIAGNOSIS — M54.16 RIGHT LUMBAR RADICULOPATHY: ICD-10-CM

## 2021-05-19 PROCEDURE — 97110 THERAPEUTIC EXERCISES: CPT | Mod: GP | Performed by: PHYSICAL THERAPIST

## 2021-05-19 PROCEDURE — 97112 NEUROMUSCULAR REEDUCATION: CPT | Mod: GP | Performed by: PHYSICAL THERAPIST

## 2021-05-19 NOTE — PROGRESS NOTES
Subjective:  HPI  Physical Exam                    Objective:  System    Physical Exam    General     ROS    Assessment/Plan:    DISCHARGE REPORT    Progress reporting period is from 3/3/2021 to 5/19/2021.     SUBJECTIVE    Subjective: Patient reports no change in pain or function in the past 3 weeks. He continues to have pain that radiates from right lumbar to buttocks and into knee. Numbness and tingling into right leg to calf intermittently. Walking is limited to 15 minutes due to low back and leg pain. Bending continues to be difficult. Sleep is severly limited   Current Pain level: 3/10   Initial Pain level: 6/10   Changes in function: No changes noted in function since last SOAP note     OBJECTIVE    Objective: Ambulates with mild limp on the right. Lumbar AROM: FB=50%; extension=10%; R side bending=50% leg pain; L sidebending=60%. Right leg strength=4/5. Positive SLR test right at 20 degrees.      ASSESSMENT/PLAN    STG/LTGs have been met or progress has been made towards goals:  Yes, slight improvement after steroid injection, but no change in the past 3 weeks.  Assessment of Progress: The patient's condition is unchanged.  Self Management Plans:  Patient has been instructed in a home treatment program.  Patient  has been instructed in self management of symptoms.    Anival continues to require the following intervention to meet STG and LTG's: PT intervention is no longer required to meet STG/LTG.  The patient is returning to your office for a recheck appointment.    Recommendations:    This patient would benefit from further evaluation.    Please refer to the daily flowsheet for treatment today, total treatment time and time spent performing 1:1 timed codes.

## 2021-05-19 NOTE — LETTER
[facility]  [id number]  [facility] [address]  [phone]    May 19, 2021    Re: Anival Moore   :   1980  MRN:  [id number]   REFERRING PHYSICIAN:   Mikey Guo    [facility]    Date of Initial Evaluation:  2021  Visits:  Rxs Used: 3  Reason for Referral:  Right lumbar radiculopathy    DISCHARGE REPORT  Progress reporting period is from 3/3/2021 to 2021.   SUBJECTIVE  Subjective: Patient reports no change in pain or function in the past 3 weeks. He continues to have pain that radiates from right lumbar to buttocks and into knee. Numbness and tingling into right leg to calf intermittently. Walking is limited to 15 minutes due to low back and leg pain. Bending continues to be difficult. Sleep is severly limited   Current Pain level: 3/10   Initial Pain level: 6/10   Changes in function: No changes noted in function since last SOAP note   OBJECTIVE  Objective: Ambulates with mild limp on the right. Lumbar AROM: FB=50%; extension=10%; R side bending=50% leg pain; L sidebending=60%. Right leg strength=4/5. Positive SLR test right at 20 degrees.    ASSESSMENT/PLAN  STG/LTGs have been met or progress has been made towards goals:  Yes, slight improvement after steroid injection, but no change in the past 3 weeks.  Assessment of Progress: The patient's condition is unchanged.  Self Management Plans:  Patient has been instructed in a home treatment program.  Patient  has been instructed in self management of symptoms.  Anival continues to require the following intervention to meet STG and LTG's: PT intervention is no longer required to meet STG/LTG.  The patient is returning to your office for a recheck appointment.  Recommendations:  This patient would benefit from further evaluation.      Thank you for your referral.  INQUIRIES  Therapist: Lorin Kelley PT  [facility]  [place]  [id number] [place] [id number]  Phone: [phone]  Fax: [phone]

## 2021-05-20 ENCOUNTER — OFFICE VISIT (OUTPATIENT)
Dept: NEUROSURGERY | Facility: CLINIC | Age: 41
End: 2021-05-20
Attending: PHYSICIAN ASSISTANT
Payer: COMMERCIAL

## 2021-05-20 VITALS
DIASTOLIC BLOOD PRESSURE: 87 MMHG | SYSTOLIC BLOOD PRESSURE: 123 MMHG | WEIGHT: 187 LBS | BODY MASS INDEX: 25.33 KG/M2 | OXYGEN SATURATION: 97 % | HEART RATE: 93 BPM | HEIGHT: 72 IN | TEMPERATURE: 98.1 F

## 2021-05-20 DIAGNOSIS — M54.16 RIGHT LUMBAR RADICULOPATHY: Primary | ICD-10-CM

## 2021-05-20 PROCEDURE — 99213 OFFICE O/P EST LOW 20 MIN: CPT | Performed by: PHYSICIAN ASSISTANT

## 2021-05-20 PROCEDURE — G0463 HOSPITAL OUTPT CLINIC VISIT: HCPCS

## 2021-05-20 ASSESSMENT — PAIN SCALES - GENERAL: PAINLEVEL: MODERATE PAIN (4)

## 2021-05-20 ASSESSMENT — MIFFLIN-ST. JEOR: SCORE: 1801.23

## 2021-05-20 NOTE — NURSING NOTE
Anival Moore is a 40 year old male who presents for:  Chief Complaint   Patient presents with     Follow Up     follow up after procedure Right lumbar radiculopathy        Initial Vitals:  /87   Pulse 93   Temp 98.1  F (36.7  C)   Ht 6' (1.829 m)   Wt 187 lb (84.8 kg)   SpO2 97%   BMI 25.36 kg/m   Estimated body mass index is 25.36 kg/m  as calculated from the following:    Height as of this encounter: 6' (1.829 m).    Weight as of this encounter: 187 lb (84.8 kg).. Body surface area is 2.08 meters squared. BP completed using cuff size: large  Moderate Pain (4)    Nursing Comments: Patient presents for follow up after procedure Right lumbar radiculopathy    Lawrence Cook MA

## 2021-05-20 NOTE — PROGRESS NOTES
NEUROSURGERY CLINIC PROGRESS NOTE    DATE OF VISIT: 5/20/2021    HPI:     Anival Moore is a pleasant 40 year old male who presents to the clinic today for a follow-up visit. We have been following Mr. Moore for approximately six months for a constant, sharp, burning pain that initiates in the midline low lumbar region and radiates distally in what sounds like the right L5 distribution. This pain is accompanied by paresthesia, numbness and perceived weakness. He has failed with conservative therapy to include physical therapy and a right L4-5 TFESI which did provide some short term relief. Mr. Moore is quite frustrated today as his symptoms have returned. He would like to further discuss surgical options.     Current Outpatient Medications   Medication     cyclobenzaprine (FLEXERIL) 5 MG tablet     venlafaxine (EFFEXOR-XR) 150 MG 24 hr capsule     Current Facility-Administered Medications   Medication     iohexol (OMNIPAQUE) 300 mg/mL injection 10 mL       No Known Allergies    Past Medical History:   Diagnosis Date     Anxiety      Depression      Spontaneous pneumothorax        Review Of Systems    Skin: negative  Eyes: negative  Ears/Nose/Throat: negative  Respiratory: No shortness of breath, dyspnea on exertion, cough, or hemoptysis  Cardiovascular: negative  Gastrointestinal: negative  Musculoskeletal: back pain  Neurologic: numbness or tingling of feet  Psychiatric: negative  Hematologic/Lymphatic/Immunologic: negative  Endocrine: negative    OBJECTIVE:    /87   Pulse 93   Temp 98.1  F (36.7  C)   Ht 6' (1.829 m)   Wt 187 lb (84.8 kg)   SpO2 97%   BMI 25.36 kg/m      Imaging:    MR LUMBAR SPINE WITHOUT CONTRAST 3/1/2021 2:36 PM     Findings notable for:    1. Small central to right paramedian L4-L5 disc protrusion  superimposed upon degenerative disc and facet disease resulting in  mild-to-moderate central canal stenosis and mild-to-moderate  subarticular lateral recess stenosis. This is resulting in  some  impingement on the right L5 nerve root.  2. No evidence for infection or neoplasm.    Radiographic Findings: Full radiological report in chart. I personally reviewed the images with the patient today.    Exam:    Patient appears comfortable and in no apparent distress. Moving all extremities.  Gait is non-antalgic.  CN II-XII grossly intact, alert and appropriate with conversation and following  commands  Bilateral upper extremities with full strength including hand intrinsics and grasp.  Sensation intact throughout.  Bilateral lower extremities 5/5 strength including plantar and dorsiflexion.  Normal sensation throughout bilaterally.    PLAN:    Mr. Moore's lumbar MRI exhibits a small central to right paramedian L4-L5 disc protrusion superimposed upon degenerative disc and facet disease resulting in mild-to-moderate central canal stenosis and mild-to-moderate subarticular lateral recess stenosis. This is resulting in some impingement on the right L5 nerve root which correlates with the objective findings on his physical exam. He has failed conservative management, and therefore, we feel that he would be best amendable to surgical intervention. We discussed surgical options that included a right L4-5 far lateral recess decompression. We also discussed continuing with non-surgical options, although the risk of permanent nerve damage is greater with continued delay of relieving the pressure from the nerve due to the stenosis.     We reviewed the surgical risks including nerve damage, infection, bleeding, residual or recurrent disk / symptoms and spinal fluid leak. This will also be performed utilizing general anesthesia which can pose both cardiovascular and respiratory risks as well.     We described the procedure as being a minimally invasive same day surgery to be performed at [facility]. We also discussed the normal postoperative recovery that would include not lifting anything greater than 5-10 pounds,  avoid excessive bending, twisting, and turning and to make frequent position changes about every 30 minutes going from sitting, standing and walking for six weeks approximately. We also discussed the timing of post-operative follow-up appointments in the Neurosurgery Clinic.     We did review the images together and explained his condition and the recommended treatment. We also discussed signs of a worsening problem that he should seek being evaluated. He appeared to have a good understanding of the situation, asked appropriate questions which we answered, and wished to proceed as we had previously recommended.     Dr. Saul will review this case prior to making any final surgical plans.      Respectfully,     TEOFILO Hook, PADangeloC

## 2021-05-20 NOTE — LETTER
5/20/2021         RE: Anival Moore  [address] [facility] [address] [address]        Dear Colleague,    Thank you for referring your patient, Anival Moore, to the [facility] NEUROSURGERY [facility]INIC [place]. Please see a copy of my visit note below.    NEUROSURGERY CLINIC PROGRESS NOTE    DATE OF VISIT: 5/20/2021    HPI:     Anival Moore is a pleasant 40 year old male who presents to the clinic today for a follow-up visit. We have been following Mr. Moore for approximately six months for a constant, sharp, burning pain that initiates in the midline low lumbar region and radiates distally in what sounds like the right L5 distribution. This pain is accompanied by paresthesia, numbness and perceived weakness. He has failed with conservative therapy to include physical therapy and a right L4-5 TFESI which did provide some short term relief. Mr. Moore is quite frustrated today as his symptoms have returned. He would like to further discuss surgical options.     Current Outpatient Medications   Medication     cyclobenzaprine (FLEXERIL) 5 MG tablet     venlafaxine (EFFEXOR-XR) 150 MG 24 hr capsule     Current Facility-Administered Medications   Medication     iohexol (OMNIPAQUE) 300 mg/mL injection 10 mL       No Known Allergies    Past Medical History:   Diagnosis Date     Anxiety      Depression      Spontaneous pneumothorax        Review Of Systems    Skin: negative  Eyes: negative  Ears/Nose/Throat: negative  Respiratory: No shortness of breath, dyspnea on exertion, cough, or hemoptysis  Cardiovascular: negative  Gastrointestinal: negative  Musculoskeletal: back pain  Neurologic: numbness or tingling of feet  Psychiatric: negative  Hematologic/Lymphatic/Immunologic: negative  Endocrine: negative    OBJECTIVE:    /87   Pulse 93   Temp 98.1  F (36.7  C)   Ht 6' (1.829 m)   Wt 187 lb (84.8 kg)   SpO2 97%   BMI 25.36 kg/m      Imaging:    MR LUMBAR SPINE WITHOUT CONTRAST 3/1/2021 2:36 PM     Findings  notable for:    1. Small central to right paramedian L4-L5 disc protrusion  superimposed upon degenerative disc and facet disease resulting in  mild-to-moderate central canal stenosis and mild-to-moderate  subarticular lateral recess stenosis. This is resulting in some  impingement on the right L5 nerve root.  2. No evidence for infection or neoplasm.    Radiographic Findings: Full radiological report in chart. I personally reviewed the images with the patient today.    Exam:    Patient appears comfortable and in no apparent distress. Moving all extremities.  Gait is non-antalgic.  CN II-XII grossly intact, alert and appropriate with conversation and following  commands  Bilateral upper extremities with full strength including hand intrinsics and grasp.  Sensation intact throughout.  Bilateral lower extremities 5/5 strength including plantar and dorsiflexion.  Normal sensation throughout bilaterally.    PLAN:    Mr. Moore's lumbar MRI exhibits a small central to right paramedian L4-L5 disc protrusion superimposed upon degenerative disc and facet disease resulting in mild-to-moderate central canal stenosis and mild-to-moderate subarticular lateral recess stenosis. This is resulting in some impingement on the right L5 nerve root which correlates with the objective findings on his physical exam. He has failed conservative management, and therefore, we feel that he would be best amendable to surgical intervention. We discussed surgical options that included a right L4-5 far lateral recess decompression. We also discussed continuing with non-surgical options, although the risk of permanent nerve damage is greater with continued delay of relieving the pressure from the nerve due to the stenosis.     We reviewed the surgical risks including nerve damage, infection, bleeding, residual or recurrent disk / symptoms and spinal fluid leak. This will also be performed utilizing general anesthesia which can pose both  cardiovascular and respiratory risks as well.     We described the procedure as being a minimally invasive same day surgery to be performed at [facility]. We also discussed the normal postoperative recovery that would include not lifting anything greater than 5-10 pounds, avoid excessive bending, twisting, and turning and to make frequent position changes about every 30 minutes going from sitting, standing and walking for six weeks approximately. We also discussed the timing of post-operative follow-up appointments in the Neurosurgery Clinic.     We did review the images together and explained his condition and the recommended treatment. We also discussed signs of a worsening problem that he should seek being evaluated. He appeared to have a good understanding of the situation, asked appropriate questions which we answered, and wished to proceed as we had previously recommended.     Dr. Saul will review this case prior to making any final surgical plans.      Respectfully,     TEOFILO Hoko PA-C      Again, thank you for allowing me to participate in the care of your patient.        Sincerely,        Mikey Guo PA-C

## 2021-05-25 ENCOUNTER — PREP FOR PROCEDURE (OUTPATIENT)
Dept: NEUROLOGY | Facility: CLINIC | Age: 41
End: 2021-05-25

## 2021-05-25 DIAGNOSIS — M51.16 HERNIATION OF LUMBAR INTERVERTEBRAL DISC WITH RADICULOPATHY: Primary | ICD-10-CM

## 2021-05-26 ENCOUNTER — TELEPHONE (OUTPATIENT)
Dept: NEUROSURGERY | Facility: CLINIC | Age: 41
End: 2021-05-26

## 2021-05-26 DIAGNOSIS — Z11.59 ENCOUNTER FOR SCREENING FOR OTHER VIRAL DISEASES: ICD-10-CM

## 2021-05-26 PROBLEM — M51.16 HERNIATION OF LUMBAR INTERVERTEBRAL DISC WITH RADICULOPATHY: Status: ACTIVE | Noted: 2021-05-26

## 2021-05-30 ENCOUNTER — RECORDS - HEALTHEAST (OUTPATIENT)
Dept: ADMINISTRATIVE | Facility: CLINIC | Age: 41
End: 2021-05-30

## 2021-05-31 ENCOUNTER — RECORDS - HEALTHEAST (OUTPATIENT)
Dept: ADMINISTRATIVE | Facility: CLINIC | Age: 41
End: 2021-05-31

## 2021-06-02 ENCOUNTER — TELEPHONE (OUTPATIENT)
Dept: NEUROSURGERY | Facility: CLINIC | Age: 41
End: 2021-06-02

## 2021-06-02 NOTE — PATIENT INSTRUCTIONS
Patient Instructions    Surgery scheduled at [facility] for Right Lumbar 4-5 minimally invasive hemilaminectomy and microdiscectomy with Dr. Saul     Pre-Operative    Surgical risks: blood clots in the leg or lung, problems urinating, nerve damage, drainage from the incision, infection,stiffness    Pre-operative physical with primary care physician within 30 days of surgical date.     Likely same day procedure with discharge home day of surgery, may stay for 23 hour observation hospitalization for monitoring.       Shower procedure  o Please shower with antimicrobial soap the night before surgery and morning of surgery. Please refer to showering instruction sheet in folder.    Eating/Drinking  o Stop all solid foods 8 hours before surgery.  o Keep drinking clear liquids until 4 hours before surgery  - Clear liquids include water, clear juice, black coffee, or clear tea without milk, Gatorade, clear soda.     Medications  o Hold Aspirin, NSAIDs (Advil/Ibuprofen, Indocin, Naproxen,Nuprin,Relafen/Nabumetone, Diclofenac,Meloxicam, Aleve, Celebrex) x 7 days prior to surgical date  o Hold methotrexate, Xeljanz for 1-2 weeks prior to surgery and continue to hold 2 weeks post surgery.   o You can take Tylenol (Acetaminophen) for pain, 1000 mg  - Do not exceed 3,000 mg per day   o Any other medications prescribed, please discuss with your primary care provider at your pre-operative physical     As part of preparation for your upcoming procedure you are required to have a test for the novel Coronavirus, COVID-19  o Please call the drive-up testing number to schedule your appointment. The test needs to be completed within 4 days (96) hours of surgery.   o Scheduling Number: [phone]     Pain Management    You will have post-operative incisional pain which may require pain medications and muscle relaxants. You will receive medication upon discharge.    You may resume taking NSAIDs (ex. Ibuprofen, aleve, naproxen)  immediately post-op    Do NOT drive while taking narcotic pain medication    Do NOT drink alcohol while using any pain medication    You can utilize ice as needed (no longer than 20 minutes at one time)    Incision Care    No submerging incision in water such as pools, hot tubs, baths for at least 8 weeks or until incision is healed    It is okay to shower, just pat the incision dry     Remove dressing as instructed upon discharge    Watch for signs of infection  o Redness, swelling, warmth, drainage, and fever of 101 degrees or higher  o Notify clinic [phone].    Activity Restrictions    For the first 6-8 weeks, no lifting > 10 pounds, limited bending, twisting, or overhead reaching.    Take stairs in moderation     Ok to walk as tolerated, take short frequent walks. You may gradually increase the distance as tolerated.     Avoid bed rest and prolonged sitting for longer than 30 minutes (change positions frequently while awake)    No contact sports until after follow up visit    No high impact activities such as; running/jogging, snowmobile or 4 gutiérrez riding or any other recreational vehicles    Please call the clinic if you develop any of the following symptoms:  o Swelling and/or warmth in one or both legs  o Pain or tenderness in your leg, ankle, foot, or arm   o Red or discolored skin     Post-Op Follow Up Appointments    2 week incision check with RN    6 week post op follow up visit with Physician Assistant    3 months post op with Dr. Saul     Please call to schedule follow up appointment at [phone]    Resources    If you are currently employed, you will need to be off work for 2-4 weeks for post op recovery and healing.    Please fax any FMLA/short term disability paperwork to [phone]    You may call our clinic when you'd like to return to work and we can provide a work letter    To allow staff adequate time to complete paperwork, please send as soon as possible

## 2021-06-02 NOTE — TELEPHONE ENCOUNTER
Reviewed pre- and post-operative instructions as outlined in the After Visit Summary/Patient Instructions with patient.   Surgery folder was mailed to patient     Patient Education Topic: Procedure with Dr. Saul     Learner(s): Patient    Knowledge Level: Basic    Readiness to Learn: Ready    Method:  Verbal explanation and Written material     Outcome: Able to verbalize instructions    Barriers to Learning: No barrier    Covid Testing: patient educated they will need to have a test for the novel Coronavirus, COVID-19.The test needs to be completed within 4 days (96) hours of surgery. Order Placed.    Scheduling Number: [phone]    Patient had the opportunity for questions to be answered. Advised Patient to call clinic with any questions/concerns. Gave patient antibacterial soap for pre-surgery skin preparation.

## 2021-06-13 ENCOUNTER — HOSPITAL ENCOUNTER (OUTPATIENT)
Dept: LAB | Facility: CLINIC | Age: 41
Discharge: HOME | End: 2021-06-13
Attending: NEUROLOGICAL SURGERY | Admitting: NEUROLOGICAL SURGERY
Payer: COMMERCIAL

## 2021-06-13 DIAGNOSIS — Z11.59 ENCOUNTER FOR SCREENING FOR OTHER VIRAL DISEASES: ICD-10-CM

## 2021-06-13 LAB
SARS-COV-2 RNA RESP QL NAA+PROBE: NORMAL
SPECIMEN SOURCE: NORMAL

## 2021-06-13 PROCEDURE — U0003 INFECTIOUS AGENT DETECTION BY NUCLEIC ACID (DNA OR RNA); SEVERE ACUTE RESPIRATORY SYNDROME CORONAVIRUS 2 (SARS-COV-2) (CORONAVIRUS DISEASE [COVID-19]), AMPLIFIED PROBE TECHNIQUE, MAKING USE OF HIGH THROUGHPUT TECHNOLOGIES AS DESCRIBED BY CMS-2020-01-R: HCPCS | Performed by: NEUROLOGICAL SURGERY

## 2021-06-13 PROCEDURE — U0005 INFEC AGEN DETEC AMPLI PROBE: HCPCS | Performed by: NEUROLOGICAL SURGERY

## 2021-06-14 ENCOUNTER — OFFICE VISIT (OUTPATIENT)
Dept: FAMILY MEDICINE | Facility: CLINIC | Age: 41
End: 2021-06-14
Payer: COMMERCIAL

## 2021-06-14 VITALS
HEART RATE: 75 BPM | RESPIRATION RATE: 16 BRPM | DIASTOLIC BLOOD PRESSURE: 76 MMHG | WEIGHT: 191 LBS | HEIGHT: 72 IN | SYSTOLIC BLOOD PRESSURE: 118 MMHG | BODY MASS INDEX: 25.87 KG/M2 | OXYGEN SATURATION: 98 % | TEMPERATURE: 98.6 F

## 2021-06-14 DIAGNOSIS — M51.26 LUMBAR DISC HERNIATION: ICD-10-CM

## 2021-06-14 DIAGNOSIS — Z01.818 PREOP GENERAL PHYSICAL EXAM: ICD-10-CM

## 2021-06-14 LAB
ANION GAP SERPL CALCULATED.3IONS-SCNC: 5 MMOL/L (ref 3–14)
BUN SERPL-MCNC: 15 MG/DL (ref 7–30)
CALCIUM SERPL-MCNC: 9.2 MG/DL (ref 8.5–10.1)
CHLORIDE SERPL-SCNC: 103 MMOL/L (ref 94–109)
CO2 SERPL-SCNC: 31 MMOL/L (ref 20–32)
CREAT SERPL-MCNC: 0.95 MG/DL (ref 0.66–1.25)
ERYTHROCYTE [DISTWIDTH] IN BLOOD BY AUTOMATED COUNT: 11.7 % (ref 10–15)
GFR SERPL CREATININE-BSD FRML MDRD: >90 ML/MIN/{1.73_M2}
GLUCOSE SERPL-MCNC: 86 MG/DL (ref 70–99)
HCT VFR BLD AUTO: 41.9 % (ref 40–53)
HGB BLD-MCNC: 14.1 G/DL (ref 13.3–17.7)
LABORATORY COMMENT REPORT: NORMAL
MCH RBC QN AUTO: 30.6 PG (ref 26.5–33)
MCHC RBC AUTO-ENTMCNC: 33.7 G/DL (ref 31.5–36.5)
MCV RBC AUTO: 91 FL (ref 78–100)
PLATELET # BLD AUTO: 290 10E9/L (ref 150–450)
POTASSIUM SERPL-SCNC: 3.8 MMOL/L (ref 3.4–5.3)
RBC # BLD AUTO: 4.61 10E12/L (ref 4.4–5.9)
SARS-COV-2 RNA RESP QL NAA+PROBE: NEGATIVE
SODIUM SERPL-SCNC: 139 MMOL/L (ref 133–144)
SPECIMEN SOURCE: NORMAL
WBC # BLD AUTO: 7.3 10E9/L (ref 4–11)

## 2021-06-14 PROCEDURE — 80048 BASIC METABOLIC PNL TOTAL CA: CPT | Performed by: FAMILY MEDICINE

## 2021-06-14 PROCEDURE — 36415 COLL VENOUS BLD VENIPUNCTURE: CPT | Performed by: FAMILY MEDICINE

## 2021-06-14 PROCEDURE — 85027 COMPLETE CBC AUTOMATED: CPT | Performed by: FAMILY MEDICINE

## 2021-06-14 PROCEDURE — 99214 OFFICE O/P EST MOD 30 MIN: CPT | Performed by: FAMILY MEDICINE

## 2021-06-14 RX ORDER — CYCLOBENZAPRINE HCL 5 MG
TABLET ORAL
Status: ON HOLD | COMMUNITY
Start: 2021-03-16 | End: 2021-06-16

## 2021-06-14 ASSESSMENT — PATIENT HEALTH QUESTIONNAIRE - PHQ9
SUM OF ALL RESPONSES TO PHQ QUESTIONS 1-9: 1
SUM OF ALL RESPONSES TO PHQ QUESTIONS 1-9: 1
10. IF YOU CHECKED OFF ANY PROBLEMS, HOW DIFFICULT HAVE THESE PROBLEMS MADE IT FOR YOU TO DO YOUR WORK, TAKE CARE OF THINGS AT HOME, OR GET ALONG WITH OTHER PEOPLE: NOT DIFFICULT AT ALL

## 2021-06-14 ASSESSMENT — MIFFLIN-ST. JEOR: SCORE: 1819.37

## 2021-06-14 NOTE — H&P (VIEW-ONLY)
[facility]  [address] [facility] [address]  Phone: [phone]  Primary Provider: Dixon Alexander  Pre-op Performing Provider: JEB WATTS    PREOPERATIVE EVALUATION:  Today's date: 6/14/2021    Anival Moore is a 40 year old male who presents for a preoperative evaluation.    Surgical Information:  Surgery/Procedure: Right Lumbar 4-5 minimally invasive hemilaminectomy and microdiscectomy  Surgery Location: [facility]  Surgeon: Dr. Saul  Surgery Date: 6/16/21  Time of Surgery: 9:20AM  Where patient plans to recover: At home with family      Type of Anesthesia Anticipated: General    Assessment & Plan     The proposed surgical procedure is considered INTERMEDIATE risk.    Preop general physical exam  Patient cleared for surgery.  - Basic metabolic panel  (Ca, Cl, CO2, Creat, Gluc, K, Na, BUN)  - CBC with platelets    Lumbar disc herniation  To be addressed by upcoming lumbar spinal surgery.               Medication Instructions:  Do not take ibuprofen, naproxen, or aspirin before your upcoming surgery. Use acetaminophen (Tylenol) instead. You continue to use your muscle relaxant through evening before surgery. You can take your venlafaxine with a small amount of water on morning of surgery.     RECOMMENDATION:  APPROVAL GIVEN to proceed with proposed procedure, without further diagnostic evaluation.              30 minutes spent on the date of the encounter doing chart review, history and exam, documentation and further activities per the note      Subjective     HPI related to upcoming procedure: Back problem started in November 202 during a 2 hour drive. He drove with a cushion behind him, with continued pain. He has had back pain awaken him at night.  MRI study from 03/01/2021 showed an L4-L5 disc herniation with impingement on the right L5 nerve root.  This is going to be addressed by upcoming surgery.      Preop Questions 6/14/2021   1. Have you ever had a heart  attack or stroke? No   2. Have you ever had surgery on your heart or blood vessels, such as a stent placement, a coronary artery bypass, or surgery on an artery in your head, neck, heart, or legs? No   3. Do you have chest pain with activity? No   4. Do you have a history of  heart failure? No   5. Do you currently have a cold, bronchitis or symptoms of other infection? No   6. Do you have a cough, shortness of breath, or wheezing? No   7. Do you or anyone in your family have previous history of blood clots? No   8. Do you or does anyone in your family have a serious bleeding problem such as prolonged bleeding following surgeries or cuts? No   9. Have you ever had problems with anemia or been told to take iron pills? No   10. Have you had any abnormal blood loss such as black, tarry or bloody stools? No   11. Have you ever had a blood transfusion? No   12. Are you willing to have a blood transfusion if it is medically needed before, during, or after your surgery? Yes   13. Have you or any of your relatives ever had problems with anesthesia? No   14. Do you have sleep apnea, excessive snoring or daytime drowsiness? No   15. Do you have any artifical heart valves or other implanted medical devices like a pacemaker, defibrillator, or continuous glucose monitor? No   16. Do you have artificial joints? No   17. Are you allergic to latex? No     Health Care Directive:  Patient does not have a Health Care Directive or Living Will: Discussed advance care planning with patient; information given to patient to review.    Preoperative Review of :   reviewed - controlled substances reflected in medication list.          Review of Systems  Constitutional, neuro, ENT, endocrine, pulmonary, cardiac, gastrointestinal, genitourinary, musculoskeletal, integument and psychiatric systems are negative, except as otherwise noted.    Patient is seen for chief reason of needing an exam for preoperative clearance for Right Lumbar 4-5  minimally invasive hemilaminectomy and microdiscectomy.    He has been using naproxen rarely (weekly) for pain relief.     The muscle relaxant cyclobenzaprine has helped with pain relief.     The right low back discomfort with radiation into the right lower extremity is now constant, exacerbated by most movements.     Patient prefer not to do preventive health labs to include hepatitis C and HIV screening labs, and lipid profile.      Patient Active Problem List    Diagnosis Date Noted     Herniation of lumbar intervertebral disc with radiculopathy 05/26/2021     Priority: Medium     Added automatically from request for surgery [id number]       Right lumbar radiculopathy 03/30/2021     Priority: Medium     Depressive disorder, not elsewhere classified 10/16/2011     Priority: Medium      Past Medical History:   Diagnosis Date     Anxiety      Depression      Spontaneous pneumothorax      Past Surgical History:   Procedure Laterality Date     APPENDECTOMY  1989     CLAVICLE SURGERY  2006     THORACIC SURGERY - THORACOSCOPY W PLEURODESIS  [id number]/2003     spontaneous pneumonia     Current Outpatient Medications   Medication Sig Dispense Refill     cyclobenzaprine (FLEXERIL) 5 MG tablet Take 1 tablet (5 mg) by mouth 2 times daily as needed for muscle spasms 30 tablet 0     venlafaxine (EFFEXOR-XR) 150 MG 24 hr capsule          No Known Allergies     Social History     Tobacco Use     Smoking status: Never Smoker     Smokeless tobacco: Never Used   Substance Use Topics     Alcohol use: Yes     Alcohol/week: 0.0 standard drinks     Comment: occasional       History   Drug Use No         Objective     /76 (Cuff Size: Adult Regular)   Pulse 75   Temp 98.6  F (37  C)   Resp 16   Ht 1.829 m (6')   Wt 86.6 kg (191 lb)   SpO2 98%   BMI 25.90 kg/m      Physical Exam  General: Vital signs reviewed.  Patient is in no acute appearing distress during majority of exam.  He tended to grimace a little while slowly getting  up and down from exam room chair and table without assistance.  Breathing appears nonlabored.  Patient is alert and oriented ×3.      ENT: Ear exam shows bilateral tympanic membranes to be clear without injection, nasal turbinates show no injection or edema, no pharyngeal injection or exudate.    Neck: supple with no adenoapthy, palpable abnormal masses, or thyroid abnormality.    Eyes: No scleral, lid, or periorbital injection or edema noted.  No eye mattering noted.  Corneas are clear. Pupils are equal round and reactive to light with normal consensual eye movement.    Heart: Heart rate is regular without murmur.    Lungs: Lungs are clear to auscultation with good airflow bilaterally.    Abdomen:  Abdomen is soft, nontender.  No palpable abnormal masses or organomegaly.  Bowel sounds are normal.    Back: No areas of tenderness.  Patient states his back discomfort is mostly with movement.    Skin: Warm and dry, with no rash or abnormal lesions noted.    Extremities: No ankle edema noted.  No joint edema or restricted range of motion noted.    Neuro: No acute focal deficits or other abnormalities noted.    Psych: Patient is very pleasant, making good eye contact, with clear and fluent speech.  Answers questions appropriately.    Recent Labs   Lab Test 01/02/20  0025   HGB 14.2         POTASSIUM 4.2   CR 1.21        Diagnostics:  Recent Results (from the past 24 hour(s))   Basic metabolic panel  (Ca, Cl, CO2, Creat, Gluc, K, Na, BUN)    Collection Time: 06/14/21 11:28 AM   Result Value Ref Range    Sodium 139 133 - 144 mmol/L    Potassium 3.8 3.4 - 5.3 mmol/L    Chloride 103 94 - 109 mmol/L    Carbon Dioxide 31 20 - 32 mmol/L    Anion Gap 5 3 - 14 mmol/L    Glucose 86 70 - 99 mg/dL    Urea Nitrogen 15 7 - 30 mg/dL    Creatinine 0.95 0.66 - 1.25 mg/dL    GFR Estimate >90 >60 mL/min/[1.73_m2]    GFR Estimate If Black >90 >60 mL/min/[1.73_m2]    Calcium 9.2 8.5 - 10.1 mg/dL   CBC with platelets     Collection Time: 06/14/21 11:28 AM   Result Value Ref Range    WBC 7.3 4.0 - 11.0 10e9/L    RBC Count 4.61 4.4 - 5.9 10e12/L    Hemoglobin 14.1 13.3 - 17.7 g/dL    Hematocrit 41.9 40.0 - 53.0 %    MCV 91 78 - 100 fl    MCH 30.6 26.5 - 33.0 pg    MCHC 33.7 31.5 - 36.5 g/dL    RDW 11.7 10.0 - 15.0 %    Platelet Count 290 150 - 450 10e9/L      No EKG required, no history of coronary heart disease, significant arrhythmia, peripheral arterial disease or other structural heart disease.    Revised Cardiac Risk Index (RCRI):  The patient has the following serious cardiovascular risks for perioperative complications:   - No serious cardiac risks = 0 points     RCRI Interpretation: 0 points: Class I (very low risk - 0.4% complication rate)           Signed Electronically by: Rafael Wilson DO  Copy of this evaluation report is provided to requesting physician.      Answers for HPI/ROS submitted by the patient on 6/14/2021   If you checked off any problems, how difficult have these problems made it for you to do your work, take care of things at home, or get along with other people?: Not difficult at all  PHQ9 TOTAL SCORE: 1

## 2021-06-14 NOTE — PROGRESS NOTES
[facility]  [address] [address] [address]  Phone: [phone]  Primary Provider: Dixon Alexander  Pre-op Performing Provider: JEB WATTS    PREOPERATIVE EVALUATION:  Today's date: 6/14/2021    Anival Moore is a 40 year old male who presents for a preoperative evaluation.    Surgical Information:  Surgery/Procedure: Right Lumbar 4-5 minimally invasive hemilaminectomy and microdiscectomy  Surgery Location: [facility]  Surgeon: Dr. Saul  Surgery Date: 6/16/21  Time of Surgery: 9:20AM  Where patient plans to recover: At home with family      Type of Anesthesia Anticipated: General    Assessment & Plan     The proposed surgical procedure is considered INTERMEDIATE risk.    Preop general physical exam  Patient cleared for surgery.  - Basic metabolic panel  (Ca, Cl, CO2, Creat, Gluc, K, Na, BUN)  - CBC with platelets    Lumbar disc herniation  To be addressed by upcoming lumbar spinal surgery.               Medication Instructions:  Do not take ibuprofen, naproxen, or aspirin before your upcoming surgery. Use acetaminophen (Tylenol) instead. You continue to use your muscle relaxant through evening before surgery. You can take your venlafaxine with a small amount of water on morning of surgery.     RECOMMENDATION:  APPROVAL GIVEN to proceed with proposed procedure, without further diagnostic evaluation.              30 minutes spent on the date of the encounter doing chart review, history and exam, documentation and further activities per the note      Subjective     HPI related to upcoming procedure: Back problem started in November 202 during a 2 hour drive. He drove with a cushion behind him, with continued pain. He has had back pain awaken him at night.  MRI study from 03/01/2021 showed an L4-L5 disc herniation with impingement on the right L5 nerve root.  This is going to be addressed by upcoming surgery.      Preop Questions 6/14/2021   1. Have you ever had a heart  attack or stroke? No   2. Have you ever had surgery on your heart or blood vessels, such as a stent placement, a coronary artery bypass, or surgery on an artery in your head, neck, heart, or legs? No   3. Do you have chest pain with activity? No   4. Do you have a history of  heart failure? No   5. Do you currently have a cold, bronchitis or symptoms of other infection? No   6. Do you have a cough, shortness of breath, or wheezing? No   7. Do you or anyone in your family have previous history of blood clots? No   8. Do you or does anyone in your family have a serious bleeding problem such as prolonged bleeding following surgeries or cuts? No   9. Have you ever had problems with anemia or been told to take iron pills? No   10. Have you had any abnormal blood loss such as black, tarry or bloody stools? No   11. Have you ever had a blood transfusion? No   12. Are you willing to have a blood transfusion if it is medically needed before, during, or after your surgery? Yes   13. Have you or any of your relatives ever had problems with anesthesia? No   14. Do you have sleep apnea, excessive snoring or daytime drowsiness? No   15. Do you have any artifical heart valves or other implanted medical devices like a pacemaker, defibrillator, or continuous glucose monitor? No   16. Do you have artificial joints? No   17. Are you allergic to latex? No     Health Care Directive:  Patient does not have a Health Care Directive or Living Will: Discussed advance care planning with patient; information given to patient to review.    Preoperative Review of :   reviewed - controlled substances reflected in medication list.          Review of Systems  Constitutional, neuro, ENT, endocrine, pulmonary, cardiac, gastrointestinal, genitourinary, musculoskeletal, integument and psychiatric systems are negative, except as otherwise noted.    Patient is seen for chief reason of needing an exam for preoperative clearance for Right Lumbar 4-5  minimally invasive hemilaminectomy and microdiscectomy.    He has been using naproxen rarely (weekly) for pain relief.     The muscle relaxant cyclobenzaprine has helped with pain relief.     The right low back discomfort with radiation into the right lower extremity is now constant, exacerbated by most movements.     Patient prefer not to do preventive health labs to include hepatitis C and HIV screening labs, and lipid profile.      Patient Active Problem List    Diagnosis Date Noted     Herniation of lumbar intervertebral disc with radiculopathy 05/26/2021     Priority: Medium     Added automatically from request for surgery [id number]       Right lumbar radiculopathy 03/30/2021     Priority: Medium     Depressive disorder, not elsewhere classified 10/16/2011     Priority: Medium      Past Medical History:   Diagnosis Date     Anxiety      Depression      Spontaneous pneumothorax      Past Surgical History:   Procedure Laterality Date     APPENDECTOMY  1989     CLAVICLE SURGERY  2006     THORACIC SURGERY - THORACOSCOPY W PLEURODESIS  [id number]/2003     spontaneous pneumonia     Current Outpatient Medications   Medication Sig Dispense Refill     cyclobenzaprine (FLEXERIL) 5 MG tablet Take 1 tablet (5 mg) by mouth 2 times daily as needed for muscle spasms 30 tablet 0     venlafaxine (EFFEXOR-XR) 150 MG 24 hr capsule          No Known Allergies     Social History     Tobacco Use     Smoking status: Never Smoker     Smokeless tobacco: Never Used   Substance Use Topics     Alcohol use: Yes     Alcohol/week: 0.0 standard drinks     Comment: occasional       History   Drug Use No         Objective     /76 (Cuff Size: Adult Regular)   Pulse 75   Temp 98.6  F (37  C)   Resp 16   Ht 1.829 m (6')   Wt 86.6 kg (191 lb)   SpO2 98%   BMI 25.90 kg/m      Physical Exam  General: Vital signs reviewed.  Patient is in no acute appearing distress during majority of exam.  He tended to grimace a little while slowly getting  up and down from exam room chair and table without assistance.  Breathing appears nonlabored.  Patient is alert and oriented ×3.      ENT: Ear exam shows bilateral tympanic membranes to be clear without injection, nasal turbinates show no injection or edema, no pharyngeal injection or exudate.    Neck: supple with no adenoapthy, palpable abnormal masses, or thyroid abnormality.    Eyes: No scleral, lid, or periorbital injection or edema noted.  No eye mattering noted.  Corneas are clear. Pupils are equal round and reactive to light with normal consensual eye movement.    Heart: Heart rate is regular without murmur.    Lungs: Lungs are clear to auscultation with good airflow bilaterally.    Abdomen:  Abdomen is soft, nontender.  No palpable abnormal masses or organomegaly.  Bowel sounds are normal.    Back: No areas of tenderness.  Patient states his back discomfort is mostly with movement.    Skin: Warm and dry, with no rash or abnormal lesions noted.    Extremities: No ankle edema noted.  No joint edema or restricted range of motion noted.    Neuro: No acute focal deficits or other abnormalities noted.    Psych: Patient is very pleasant, making good eye contact, with clear and fluent speech.  Answers questions appropriately.    Recent Labs   Lab Test 01/02/20  0025   HGB 14.2         POTASSIUM 4.2   CR 1.21        Diagnostics:  Recent Results (from the past 24 hour(s))   Basic metabolic panel  (Ca, Cl, CO2, Creat, Gluc, K, Na, BUN)    Collection Time: 06/14/21 11:28 AM   Result Value Ref Range    Sodium 139 133 - 144 mmol/L    Potassium 3.8 3.4 - 5.3 mmol/L    Chloride 103 94 - 109 mmol/L    Carbon Dioxide 31 20 - 32 mmol/L    Anion Gap 5 3 - 14 mmol/L    Glucose 86 70 - 99 mg/dL    Urea Nitrogen 15 7 - 30 mg/dL    Creatinine 0.95 0.66 - 1.25 mg/dL    GFR Estimate >90 >60 mL/min/[1.73_m2]    GFR Estimate If Black >90 >60 mL/min/[1.73_m2]    Calcium 9.2 8.5 - 10.1 mg/dL   CBC with platelets     Collection Time: 06/14/21 11:28 AM   Result Value Ref Range    WBC 7.3 4.0 - 11.0 10e9/L    RBC Count 4.61 4.4 - 5.9 10e12/L    Hemoglobin 14.1 13.3 - 17.7 g/dL    Hematocrit 41.9 40.0 - 53.0 %    MCV 91 78 - 100 fl    MCH 30.6 26.5 - 33.0 pg    MCHC 33.7 31.5 - 36.5 g/dL    RDW 11.7 10.0 - 15.0 %    Platelet Count 290 150 - 450 10e9/L      No EKG required, no history of coronary heart disease, significant arrhythmia, peripheral arterial disease or other structural heart disease.    Revised Cardiac Risk Index (RCRI):  The patient has the following serious cardiovascular risks for perioperative complications:   - No serious cardiac risks = 0 points     RCRI Interpretation: 0 points: Class I (very low risk - 0.4% complication rate)           Signed Electronically by: Rafael Wilson DO  Copy of this evaluation report is provided to requesting physician.      Answers for HPI/ROS submitted by the patient on 6/14/2021   If you checked off any problems, how difficult have these problems made it for you to do your work, take care of things at home, or get along with other people?: Not difficult at all  PHQ9 TOTAL SCORE: 1

## 2021-06-14 NOTE — PATIENT INSTRUCTIONS
Do not take ibuprofen, naproxen, or aspirin before your upcoming surgery. Use acetaminophen (Tylenol) instead. You continue to use your muscle relaxant through evening before surgery. You can take your venlafaxine with a small amount of water on morning of surgery.     Preparing for Your Surgery  Getting started  A nurse will call you to review your health history and instructions. They will give you an arrival time based on your scheduled surgery time.  Please be ready to share the following:    Your doctor's clinic name and phone number    Your medical, surgical and anesthesia history    A list of allergies and sensitivities    A list of medicines, including herbal treatments and over-the-counter drugs    Whether the patient has a legal guardian (ask how to send us the papers in advance)  If you have a child who's having surgery, please ask for a copy of Preparing for Your Child's Surgery.    Preparing for surgery    Within 30 days of surgery: Have a pre-op exam (sometimes called an H&P, or History and Physical). This can be done at a clinic or pre-operative center.  ? If you're having a , you may not need this exam. Talk to your care team    At your pre-op exam, talk to your care team about all medicines you take. If you need to stop any medicines before surgery, ask when to start taking them again.  ? We do this for your safety. Many medicines can make you bleed too much during surgery. Some change how well surgery (anesthesia) drugs work.    Call your insurance company to let them know you're having surgery. (If you don't have insurance, call [phone].)    Call your clinic if there's any change in your health. This includes signs of a cold or flu (sore throat, runny nose, cough, rash, fever). It also includes a scrape or scratch near the surgery site.    If you have questions on the day of surgery, call your hospital or surgery center.  Eating and drinking guidelines  For your safety: Unless your  surgeon tells you otherwise, follow the guidelines below.    Eat and drink as usual until 8 hours before surgery. After that, no food or milk.    Drink clear liquids until 2 hours before surgery. These are liquids you can see through, like water, Gatorade and Propel Water. You may also have black coffee and tea (no cream or milk).    Nothing by mouth within 2 hours of surgery. This includes gum, candy and breath mints.    If you drink, stop drinking alcohol the night before surgery.    If your care team tells you to take medicine on the morning of surgery, it's okay to take it with a sip of water.  Preventing infection    Shower or bathe the night before and morning of your surgery. Follow the instructions your clinic gave you. (If no instructions, use regular soap.)    Don't shave or clip hair near your surgery site. We'll remove the hair if needed.    Don't smoke or vape the morning of surgery. You may chew nicotine gum up to 2 hours before surgery. A nicotine patch is okay.  ? Note: Some surgeries require you to completely quit smoking and nicotine. Check with your surgeon.    Your care team will make every effort to keep you safe from infection. We will:  ? Clean our hands often with soap and water (or an alcohol-based hand rub).  ? Clean the skin at your surgery site with a special soap that kills germs.  ? Give you a special gown to keep you warm. (Cold raises the risk of infection.)  ? Wear special hair covers, masks, gowns and gloves during surgery.  ? Give antibiotic medicine, if prescribed. Not all surgeries need antibiotics.  What to bring on the day of surgery    Photo ID and insurance card    Copy of your health care directive, if you have one    Glasses and hearing aides (bring cases)  ? You can't wear contacts during surgery    Inhaler and eye drops, if you use them (tell us about these when you arrive)    CPAP machine or breathing device, if you use them    A few personal items, if spending the  night    If you have . . .  ? A pacemaker or ICD (cardiac defibrillator): Bring the ID card.  ? An implanted stimulator: Bring the remote control.  ? A legal guardian: Bring a copy of the certified (court-stamped) guardianship papers.  Please remove any jewelry, including body piercings. Leave jewelry and other valuables at home.  If you're going home the day of surgery  Important: If you don't follow the rules below, we must cancel your surgery.     Arrange for someone to drive you home after surgery. You may not drive, take a taxi or take public transportation by yourself (unless you'll have local anesthesia only).    Arrange for a responsible adult to stay with you overnight. If you don't, we may keep you in the hospital overnight, and you may need to pay the costs yourself.  Questions?   If you have any questions for your care team, list them here: _________________________________________________________________________________________________________________________________________________________________________________________________________________________________________________________________________________________________________________________  For informational purposes only. Not to replace the advice of your health care provider. Copyright   2003, 2019 [facility]. All rights reserved. Clinically reviewed by Carmen Mueller MD. [email or web address] [id number] - REV 4/20.

## 2021-06-15 ASSESSMENT — PATIENT HEALTH QUESTIONNAIRE - PHQ9: SUM OF ALL RESPONSES TO PHQ QUESTIONS 1-9: 1

## 2021-06-16 ENCOUNTER — ANESTHESIA (OUTPATIENT)
Dept: SURGERY | Facility: CLINIC | Age: 41
End: 2021-06-16
Payer: COMMERCIAL

## 2021-06-16 ENCOUNTER — ANESTHESIA EVENT (OUTPATIENT)
Dept: SURGERY | Facility: CLINIC | Age: 41
End: 2021-06-16
Payer: COMMERCIAL

## 2021-06-16 ENCOUNTER — APPOINTMENT (OUTPATIENT)
Dept: GENERAL RADIOLOGY | Facility: CLINIC | Age: 41
End: 2021-06-16
Attending: NEUROLOGICAL SURGERY
Payer: COMMERCIAL

## 2021-06-16 ENCOUNTER — HOSPITAL ENCOUNTER (OUTPATIENT)
Facility: CLINIC | Age: 41
Discharge: HOME | End: 2021-06-16
Attending: NEUROLOGICAL SURGERY | Admitting: NEUROLOGICAL SURGERY
Payer: COMMERCIAL

## 2021-06-16 VITALS
WEIGHT: 192.5 LBS | OXYGEN SATURATION: 95 % | TEMPERATURE: 97.4 F | BODY MASS INDEX: 26.07 KG/M2 | SYSTOLIC BLOOD PRESSURE: 138 MMHG | HEIGHT: 72 IN | HEART RATE: 84 BPM | RESPIRATION RATE: 16 BRPM | DIASTOLIC BLOOD PRESSURE: 83 MMHG

## 2021-06-16 DIAGNOSIS — M51.16 HERNIATION OF LUMBAR INTERVERTEBRAL DISC WITH RADICULOPATHY: ICD-10-CM

## 2021-06-16 PROCEDURE — 999N000179 XR SURGERY CARM FLUORO LESS THAN 5 MIN W STILLS: Mod: TC

## 2021-06-16 PROCEDURE — 710N000009 HC RECOVERY PHASE 1, LEVEL 1, PER MIN: Performed by: NEUROLOGICAL SURGERY

## 2021-06-16 PROCEDURE — 250N000011 HC RX IP 250 OP 636: Performed by: NURSE ANESTHETIST, CERTIFIED REGISTERED

## 2021-06-16 PROCEDURE — 999N000141 HC STATISTIC PRE-PROCEDURE NURSING ASSESSMENT: Performed by: NEUROLOGICAL SURGERY

## 2021-06-16 PROCEDURE — 710N000012 HC RECOVERY PHASE 2, PER MINUTE: Performed by: NEUROLOGICAL SURGERY

## 2021-06-16 PROCEDURE — 258N000003 HC RX IP 258 OP 636: Performed by: ANESTHESIOLOGY

## 2021-06-16 PROCEDURE — 250N000009 HC RX 250: Performed by: NEUROLOGICAL SURGERY

## 2021-06-16 PROCEDURE — 360N000084 HC SURGERY LEVEL 4 W/ FLUORO, PER MIN: Performed by: NEUROLOGICAL SURGERY

## 2021-06-16 PROCEDURE — 370N000017 HC ANESTHESIA TECHNICAL FEE, PER MIN: Performed by: NEUROLOGICAL SURGERY

## 2021-06-16 PROCEDURE — 250N000013 HC RX MED GY IP 250 OP 250 PS 637: Performed by: NEUROLOGICAL SURGERY

## 2021-06-16 PROCEDURE — 63030 LAMOT DCMPRN NRV RT 1 LMBR: CPT | Mod: AS | Performed by: PHYSICIAN ASSISTANT

## 2021-06-16 PROCEDURE — 250N000011 HC RX IP 250 OP 636: Performed by: NEUROLOGICAL SURGERY

## 2021-06-16 PROCEDURE — 63030 LAMOT DCMPRN NRV RT 1 LMBR: CPT | Mod: RT | Performed by: NEUROLOGICAL SURGERY

## 2021-06-16 PROCEDURE — 250N000009 HC RX 250: Performed by: NURSE ANESTHETIST, CERTIFIED REGISTERED

## 2021-06-16 PROCEDURE — 250N000011 HC RX IP 250 OP 636: Performed by: ANESTHESIOLOGY

## 2021-06-16 PROCEDURE — 250N000005 HC OR RX SURGIFLO HEMOSTATIC MATRIX 10ML 199102S OPNP: Performed by: NEUROLOGICAL SURGERY

## 2021-06-16 PROCEDURE — 250N000013 HC RX MED GY IP 250 OP 250 PS 637: Performed by: PHYSICIAN ASSISTANT

## 2021-06-16 PROCEDURE — 272N000001 HC OR GENERAL SUPPLY STERILE: Performed by: NEUROLOGICAL SURGERY

## 2021-06-16 RX ORDER — OXYCODONE HYDROCHLORIDE 5 MG/1
5 TABLET ORAL
Status: COMPLETED | OUTPATIENT
Start: 2021-06-16 | End: 2021-06-16

## 2021-06-16 RX ORDER — NALOXONE HYDROCHLORIDE 0.4 MG/ML
0.4 INJECTION, SOLUTION INTRAMUSCULAR; INTRAVENOUS; SUBCUTANEOUS
Status: DISCONTINUED | OUTPATIENT
Start: 2021-06-16 | End: 2021-06-16 | Stop reason: HOSPADM

## 2021-06-16 RX ORDER — LIDOCAINE 40 MG/G
CREAM TOPICAL
Status: DISCONTINUED | OUTPATIENT
Start: 2021-06-16 | End: 2021-06-16 | Stop reason: HOSPADM

## 2021-06-16 RX ORDER — METOPROLOL TARTRATE 1 MG/ML
1-2 INJECTION, SOLUTION INTRAVENOUS EVERY 5 MIN PRN
Status: DISCONTINUED | OUTPATIENT
Start: 2021-06-16 | End: 2021-06-16 | Stop reason: HOSPADM

## 2021-06-16 RX ORDER — DIMENHYDRINATE 50 MG/ML
25 INJECTION, SOLUTION INTRAMUSCULAR; INTRAVENOUS
Status: DISCONTINUED | OUTPATIENT
Start: 2021-06-16 | End: 2021-06-16 | Stop reason: HOSPADM

## 2021-06-16 RX ORDER — HYDRALAZINE HYDROCHLORIDE 20 MG/ML
2.5-5 INJECTION INTRAMUSCULAR; INTRAVENOUS EVERY 10 MIN PRN
Status: DISCONTINUED | OUTPATIENT
Start: 2021-06-16 | End: 2021-06-16 | Stop reason: HOSPADM

## 2021-06-16 RX ORDER — GLYCOPYRROLATE 0.2 MG/ML
INJECTION, SOLUTION INTRAMUSCULAR; INTRAVENOUS PRN
Status: DISCONTINUED | OUTPATIENT
Start: 2021-06-16 | End: 2021-06-16

## 2021-06-16 RX ORDER — BUPIVACAINE HYDROCHLORIDE AND EPINEPHRINE 2.5; 5 MG/ML; UG/ML
INJECTION, SOLUTION INFILTRATION; PERINEURAL PRN
Status: DISCONTINUED | OUTPATIENT
Start: 2021-06-16 | End: 2021-06-16 | Stop reason: HOSPADM

## 2021-06-16 RX ORDER — METOCLOPRAMIDE HYDROCHLORIDE 5 MG/ML
10 INJECTION INTRAMUSCULAR; INTRAVENOUS EVERY 6 HOURS PRN
Status: DISCONTINUED | OUTPATIENT
Start: 2021-06-16 | End: 2021-06-16 | Stop reason: HOSPADM

## 2021-06-16 RX ORDER — ONDANSETRON 2 MG/ML
4 INJECTION INTRAMUSCULAR; INTRAVENOUS EVERY 30 MIN PRN
Status: DISCONTINUED | OUTPATIENT
Start: 2021-06-16 | End: 2021-06-16 | Stop reason: HOSPADM

## 2021-06-16 RX ORDER — FENTANYL CITRATE 50 UG/ML
INJECTION, SOLUTION INTRAMUSCULAR; INTRAVENOUS PRN
Status: DISCONTINUED | OUTPATIENT
Start: 2021-06-16 | End: 2021-06-16

## 2021-06-16 RX ORDER — CYCLOBENZAPRINE HCL 5 MG
5 TABLET ORAL 3 TIMES DAILY PRN
Qty: 42 TABLET | Refills: 0 | Status: SHIPPED | OUTPATIENT
Start: 2021-06-16 | End: 2022-03-17

## 2021-06-16 RX ORDER — NALOXONE HYDROCHLORIDE 0.4 MG/ML
0.2 INJECTION, SOLUTION INTRAMUSCULAR; INTRAVENOUS; SUBCUTANEOUS
Status: DISCONTINUED | OUTPATIENT
Start: 2021-06-16 | End: 2021-06-16 | Stop reason: HOSPADM

## 2021-06-16 RX ORDER — LIDOCAINE HYDROCHLORIDE 10 MG/ML
INJECTION, SOLUTION INFILTRATION; PERINEURAL PRN
Status: DISCONTINUED | OUTPATIENT
Start: 2021-06-16 | End: 2021-06-16

## 2021-06-16 RX ORDER — CEFAZOLIN SODIUM 2 G/100ML
2 INJECTION, SOLUTION INTRAVENOUS SEE ADMIN INSTRUCTIONS
Status: DISCONTINUED | OUTPATIENT
Start: 2021-06-16 | End: 2021-06-16 | Stop reason: HOSPADM

## 2021-06-16 RX ORDER — SODIUM CHLORIDE, SODIUM LACTATE, POTASSIUM CHLORIDE, CALCIUM CHLORIDE 600; 310; 30; 20 MG/100ML; MG/100ML; MG/100ML; MG/100ML
INJECTION, SOLUTION INTRAVENOUS CONTINUOUS
Status: DISCONTINUED | OUTPATIENT
Start: 2021-06-16 | End: 2021-06-16 | Stop reason: HOSPADM

## 2021-06-16 RX ORDER — ACETAMINOPHEN 325 MG/1
975 TABLET ORAL ONCE
Status: COMPLETED | OUTPATIENT
Start: 2021-06-16 | End: 2021-06-16

## 2021-06-16 RX ORDER — ACETAMINOPHEN 325 MG/1
650 TABLET ORAL
Status: DISCONTINUED | OUTPATIENT
Start: 2021-06-16 | End: 2021-06-16 | Stop reason: HOSPADM

## 2021-06-16 RX ORDER — METHOCARBAMOL 750 MG/1
750 TABLET, FILM COATED ORAL
Status: COMPLETED | OUTPATIENT
Start: 2021-06-16 | End: 2021-06-16

## 2021-06-16 RX ORDER — ONDANSETRON 2 MG/ML
INJECTION INTRAMUSCULAR; INTRAVENOUS PRN
Status: DISCONTINUED | OUTPATIENT
Start: 2021-06-16 | End: 2021-06-16

## 2021-06-16 RX ORDER — FENTANYL CITRATE 50 UG/ML
25-50 INJECTION, SOLUTION INTRAMUSCULAR; INTRAVENOUS
Status: DISCONTINUED | OUTPATIENT
Start: 2021-06-16 | End: 2021-06-16 | Stop reason: HOSPADM

## 2021-06-16 RX ORDER — PROPOFOL 10 MG/ML
INJECTION, EMULSION INTRAVENOUS PRN
Status: DISCONTINUED | OUTPATIENT
Start: 2021-06-16 | End: 2021-06-16

## 2021-06-16 RX ORDER — ONDANSETRON 4 MG/1
4 TABLET, ORALLY DISINTEGRATING ORAL EVERY 30 MIN PRN
Status: DISCONTINUED | OUTPATIENT
Start: 2021-06-16 | End: 2021-06-16 | Stop reason: HOSPADM

## 2021-06-16 RX ORDER — CEFAZOLIN SODIUM 2 G/100ML
2 INJECTION, SOLUTION INTRAVENOUS
Status: DISCONTINUED | OUTPATIENT
Start: 2021-06-16 | End: 2021-06-16 | Stop reason: HOSPADM

## 2021-06-16 RX ORDER — MEPERIDINE HYDROCHLORIDE 25 MG/ML
12.5 INJECTION INTRAMUSCULAR; INTRAVENOUS; SUBCUTANEOUS
Status: DISCONTINUED | OUTPATIENT
Start: 2021-06-16 | End: 2021-06-16 | Stop reason: HOSPADM

## 2021-06-16 RX ORDER — DOCUSATE SODIUM 100 MG/1
100 CAPSULE, LIQUID FILLED ORAL 2 TIMES DAILY PRN
Qty: 20 CAPSULE | Refills: 0 | Status: SHIPPED | OUTPATIENT
Start: 2021-06-16 | End: 2022-03-17

## 2021-06-16 RX ORDER — DEXAMETHASONE SODIUM PHOSPHATE 4 MG/ML
INJECTION, SOLUTION INTRA-ARTICULAR; INTRALESIONAL; INTRAMUSCULAR; INTRAVENOUS; SOFT TISSUE PRN
Status: DISCONTINUED | OUTPATIENT
Start: 2021-06-16 | End: 2021-06-16

## 2021-06-16 RX ORDER — KETOROLAC TROMETHAMINE 30 MG/ML
30 INJECTION, SOLUTION INTRAMUSCULAR; INTRAVENOUS EVERY 6 HOURS PRN
Status: DISCONTINUED | OUTPATIENT
Start: 2021-06-16 | End: 2021-06-16 | Stop reason: HOSPADM

## 2021-06-16 RX ORDER — IBUPROFEN 800 MG/1
800 TABLET, FILM COATED ORAL EVERY 8 HOURS PRN
Qty: 42 TABLET | Refills: 0 | Status: SHIPPED | OUTPATIENT
Start: 2021-06-16 | End: 2022-03-17

## 2021-06-16 RX ORDER — ONDANSETRON 4 MG/1
4 TABLET, ORALLY DISINTEGRATING ORAL
Status: DISCONTINUED | OUTPATIENT
Start: 2021-06-16 | End: 2021-06-16 | Stop reason: HOSPADM

## 2021-06-16 RX ORDER — NEOSTIGMINE METHYLSULFATE 1 MG/ML
VIAL (ML) INJECTION PRN
Status: DISCONTINUED | OUTPATIENT
Start: 2021-06-16 | End: 2021-06-16

## 2021-06-16 RX ORDER — METOCLOPRAMIDE 10 MG/1
10 TABLET ORAL EVERY 6 HOURS PRN
Status: DISCONTINUED | OUTPATIENT
Start: 2021-06-16 | End: 2021-06-16 | Stop reason: HOSPADM

## 2021-06-16 RX ORDER — HYDROXYZINE HYDROCHLORIDE 25 MG/1
25 TABLET, FILM COATED ORAL
Status: DISCONTINUED | OUTPATIENT
Start: 2021-06-16 | End: 2021-06-16 | Stop reason: HOSPADM

## 2021-06-16 RX ORDER — OXYCODONE HYDROCHLORIDE 5 MG/1
5 TABLET ORAL EVERY 6 HOURS PRN
Qty: 42 TABLET | Refills: 0 | Status: SHIPPED | OUTPATIENT
Start: 2021-06-16 | End: 2022-03-17

## 2021-06-16 RX ORDER — METHOCARBAMOL 500 MG/1
500 TABLET, FILM COATED ORAL 4 TIMES DAILY PRN
Qty: 42 TABLET | Refills: 0 | Status: SHIPPED | OUTPATIENT
Start: 2021-06-16 | End: 2022-03-17

## 2021-06-16 RX ADMIN — FENTANYL CITRATE 25 MCG: 50 INJECTION, SOLUTION INTRAMUSCULAR; INTRAVENOUS at 12:06

## 2021-06-16 RX ADMIN — GLYCOPYRROLATE 0.2 MG: 0.2 INJECTION, SOLUTION INTRAMUSCULAR; INTRAVENOUS at 09:48

## 2021-06-16 RX ADMIN — FENTANYL CITRATE 100 MCG: 50 INJECTION, SOLUTION INTRAMUSCULAR; INTRAVENOUS at 09:48

## 2021-06-16 RX ADMIN — OXYCODONE HYDROCHLORIDE 5 MG: 5 TABLET ORAL at 12:23

## 2021-06-16 RX ADMIN — ONDANSETRON HYDROCHLORIDE 4 MG: 2 INJECTION, SOLUTION INTRAVENOUS at 10:02

## 2021-06-16 RX ADMIN — NEOSTIGMINE METHYLSULFATE 4 MG: 1 INJECTION, SOLUTION INTRAVENOUS at 10:38

## 2021-06-16 RX ADMIN — ROCURONIUM BROMIDE 50 MG: 10 INJECTION INTRAVENOUS at 09:48

## 2021-06-16 RX ADMIN — DEXAMETHASONE SODIUM PHOSPHATE 4 MG: 4 INJECTION, SOLUTION INTRA-ARTICULAR; INTRALESIONAL; INTRAMUSCULAR; INTRAVENOUS; SOFT TISSUE at 09:48

## 2021-06-16 RX ADMIN — FENTANYL CITRATE 50 MCG: 50 INJECTION, SOLUTION INTRAMUSCULAR; INTRAVENOUS at 12:16

## 2021-06-16 RX ADMIN — SODIUM CHLORIDE, POTASSIUM CHLORIDE, SODIUM LACTATE AND CALCIUM CHLORIDE: 600; 310; 30; 20 INJECTION, SOLUTION INTRAVENOUS at 09:00

## 2021-06-16 RX ADMIN — PROPOFOL 200 MG: 10 INJECTION, EMULSION INTRAVENOUS at 09:48

## 2021-06-16 RX ADMIN — ACETAMINOPHEN 975 MG: 325 TABLET, FILM COATED ORAL at 08:23

## 2021-06-16 RX ADMIN — SODIUM CHLORIDE, POTASSIUM CHLORIDE, SODIUM LACTATE AND CALCIUM CHLORIDE: 600; 310; 30; 20 INJECTION, SOLUTION INTRAVENOUS at 10:33

## 2021-06-16 RX ADMIN — METHOCARBAMOL 750 MG: 750 TABLET ORAL at 12:23

## 2021-06-16 RX ADMIN — CEFAZOLIN SODIUM 2 G: 2 INJECTION, SOLUTION INTRAVENOUS at 09:48

## 2021-06-16 RX ADMIN — HYDROMORPHONE HYDROCHLORIDE 1 MG: 1 INJECTION, SOLUTION INTRAMUSCULAR; INTRAVENOUS; SUBCUTANEOUS at 10:01

## 2021-06-16 RX ADMIN — FENTANYL CITRATE 25 MCG: 50 INJECTION, SOLUTION INTRAMUSCULAR; INTRAVENOUS at 12:10

## 2021-06-16 RX ADMIN — LIDOCAINE HYDROCHLORIDE 50 MG: 10 INJECTION, SOLUTION INFILTRATION; PERINEURAL at 09:48

## 2021-06-16 RX ADMIN — GLYCOPYRROLATE 0.5 MG: 0.2 INJECTION, SOLUTION INTRAMUSCULAR; INTRAVENOUS at 10:38

## 2021-06-16 RX ADMIN — MIDAZOLAM 2 MG: 1 INJECTION INTRAMUSCULAR; INTRAVENOUS at 09:36

## 2021-06-16 ASSESSMENT — MIFFLIN-ST. JEOR: SCORE: 1826.17

## 2021-06-16 NOTE — ANESTHESIA PROCEDURE NOTES
Airway       Patient location during procedure: OR  Staff -        Performed By: CRNA  Consent for Airway        Urgency: elective  Indications and Patient Condition       Indications for airway management: matthew-procedural       Induction type:intravenous       Mask difficulty assessment: 1 - vent by mask    Final Airway Details       Final airway type: endotracheal airway       Successful airway: ETT - single  Endotracheal Airway Details        ETT size (mm): 8.0       Cuffed: yes       Successful intubation technique: direct laryngoscopy       DL Blade Type: MAC 3       Grade View of Cords: 1       Adjucts: stylet       Position: Right       Measured from: gums/teeth       Secured at (cm): 24       Bite block used: Soft    Post intubation assessment        Placement verified by: capnometry, equal breath sounds and chest rise        Number of attempts at approach: 1       Secured with: plastic tape       Ease of procedure: easy       Dentition: Intact and Unchanged    Medication(s) Administered   Medication Administration Time: 6/16/2021 9:43 AM

## 2021-06-16 NOTE — ANESTHESIA CARE TRANSFER NOTE
Patient: Anival Moore    Procedure(s):  Right Lumbar 4-5 minimally invasive hemilaminectomy and microdiscectomy    Diagnosis: Herniation of lumbar intervertebral disc with radiculopathy [M51.16]  Diagnosis Additional Information: No value filed.    Anesthesia Type:   General     Note:    Oropharynx: oropharynx clear of all foreign objects and spontaneously breathing  Level of Consciousness: drowsy  Oxygen Supplementation: face mask  Level of Supplemental Oxygen (L/min / FiO2): 6  Independent Airway: airway patency satisfactory and stable  Dentition: dentition unchanged  Vital Signs Stable: post-procedure vital signs reviewed and stable  Report to RN Given: handoff report given  Patient transferred to: PACU    Handoff Report: Identifed the Patient, Identified the Reponsible Provider, Reviewed the pertinent medical history, Discussed the surgical course, Reviewed Intra-OP anesthesia mangement and issues during anesthesia, Set expectations for post-procedure period and Allowed opportunity for questions and acknowledgement of understanding      Vitals: (Last set prior to Anesthesia Care Transfer)  CRNA VITALS  6/16/2021 1017 - 6/16/2021 1056      6/16/2021             Pulse:  75    SpO2:  96 %    Resp Rate (observed):  (!) 2        Electronically Signed By: HEATH Carolina CRNA  June 16, 2021  10:56 AM

## 2021-06-16 NOTE — DISCHARGE INSTRUCTIONS
DR. TYRONE COCHRAN M.D.                    CLINIC PHONE NUMBER:  [phone]           [facility] [place]    Maximum acetaminophen (Tylenol) dose from all sources should not exceed 4 grams (4000 mg) per day.  You  Received 975 mg of tylenol at 0830 am today  Maximum Ibuprofen/Motrin in 24 hours = 3,000 mg.    GENERAL ANESTHESIA OR SEDATION ADULT DISCHARGE INSTRUCTIONS   SPECIAL PRECAUTIONS FOR 24 HOURS AFTER SURGERY    IT IS NOT UNUSUAL TO FEEL LIGHT-HEADED OR FAINT, UP TO 24 HOURS AFTER SURGERY OR WHILE TAKING PAIN MEDICATION.  IF YOU HAVE THESE SYMPTOMS; SIT FOR A FEW MINUTES BEFORE STANDING AND HAVE SOMEONE ASSIST YOU WHEN YOU GET UP TO WALK OR USE THE BATHROOM.    YOU SHOULD REST AND RELAX FOR THE NEXT 24 HOURS AND YOU MUST MAKE ARRANGEMENTS TO HAVE SOMEONE STAY WITH YOU FOR AT LEAST 24 HOURS AFTER YOUR DISCHARGE.  AVOID HAZARDOUS AND STRENUOUS ACTIVITIES.  DO NOT MAKE IMPORTANT DECISIONS FOR 24 HOURS.    DO NOT DRIVE ANY VEHICLE OR OPERATE MECHANICAL EQUIPMENT FOR 24 HOURS FOLLOWING THE END OF YOUR SURGERY.  EVEN THOUGH YOU MAY FEEL NORMAL, YOUR REACTIONS MAY BE AFFECTED BY THE MEDICATION YOU HAVE RECEIVED.    DO NOT DRINK ALCOHOLIC BEVERAGES FOR 24 HOURS FOLLOWING YOUR SURGERY.    DRINK CLEAR LIQUIDS (APPLE JUICE, GINGER ALE, 7-UP, BROTH, ETC.).  PROGRESS TO YOUR REGULAR DIET AS YOU FEEL ABLE.    YOU MAY HAVE A DRY MOUTH, A SORE THROAT, MUSCLES ACHES OR TROUBLE SLEEPING.  THESE SHOULD GO AWAY AFTER 24 HOURS.    CALL YOUR DOCTOR FOR ANY OF THE FOLLOWING:  SIGNS OF INFECTION (FEVER, GROWING TENDERNESS AT THE SURGERY SITE, A LARGE AMOUNT OF DRAINAGE OR BLEEDING, SEVERE PAIN, FOUL-SMELLING DRAINAGE, REDNESS OR SWELLING.    IT HAS BEEN OVER 8 TO 10 HOURS SINCE SURGERY AND YOU ARE STILL NOT ABLE TO URINATE (PASS WATER).

## 2021-06-16 NOTE — ANESTHESIA PREPROCEDURE EVALUATION
Anesthesia Pre-Procedure Evaluation    Patient: Anival Moore   MRN: [id number] : 1980        Preoperative Diagnosis: Herniation of lumbar intervertebral disc with radiculopathy [M51.16]   Procedure : Procedure(s):  Right Lumbar 4-5 minimally invasive hemilaminectomy and microdiscectomy     Past Medical History:   Diagnosis Date     Anxiety      Depression      Spontaneous pneumothorax       Past Surgical History:   Procedure Laterality Date     APPENDECTOMY  1989     CLAVICLE SURGERY       THORACIC SURGERY - THORACOSCOPY W PLEURODESIS  8596-7231/2003     spontaneous pneumonia      No Known Allergies   Social History     Tobacco Use     Smoking status: Never Smoker     Smokeless tobacco: Never Used   Substance Use Topics     Alcohol use: Yes     Alcohol/week: 0.0 standard drinks     Comment: occasional 1x/few months.      Wt Readings from Last 1 Encounters:   21 87.3 kg (192 lb 8 oz)        Anesthesia Evaluation   Pt has had prior anesthetic. Type: General.        ROS/MED HX  ENT/Pulmonary:  - neg pulmonary ROS     Neurologic: Comment: radiculopathy      Cardiovascular: Comment: Spontaneous pneumothorax      METS/Exercise Tolerance:     Hematologic: Comments: Lab Test        21                       1128          0025          WBC          7.3          10.5          HGB          14.1         14.2          MCV          91           92            PLT          290          301            Lab Test        21                       1128          0025          NA           139          140           POTASSIUM    3.8          4.2           CHLORIDE     103          109           CO2          31           28            BUN          15           18            CR           0.95         1.21          ANIONGAP     5            3             ELGIN          9.2          8.3*          GLC          86           95                  Musculoskeletal:  - neg musculoskeletal ROS      GI/Hepatic:  - neg GI/hepatic ROS     Renal/Genitourinary:  - neg Renal ROS     Endo:  - neg endo ROS     Psychiatric/Substance Use:     (+) psychiatric history anxiety and depression     Infectious Disease:  - neg infectious disease ROS     Malignancy:  - neg malignancy ROS     Other:  - neg other ROS          Physical Exam    Airway        Mallampati: II   TM distance: > 3 FB   Neck ROM: full   Mouth opening: > 3 cm    Respiratory Devices and Support         Dental  no notable dental history         Cardiovascular   cardiovascular exam normal          Pulmonary   pulmonary exam normal                OUTSIDE LABS:  CBC:   Lab Results   Component Value Date    WBC 7.3 06/14/2021    WBC 10.5 01/02/2020    HGB 14.1 06/14/2021    HGB 14.2 01/02/2020    HCT 41.9 06/14/2021    HCT 43.1 01/02/2020     06/14/2021     01/02/2020     BMP:   Lab Results   Component Value Date     06/14/2021     01/02/2020    POTASSIUM 3.8 06/14/2021    POTASSIUM 4.2 01/02/2020    CHLORIDE 103 06/14/2021    CHLORIDE 109 01/02/2020    CO2 31 06/14/2021    CO2 28 01/02/2020    BUN 15 06/14/2021    BUN 18 01/02/2020    CR 0.95 06/14/2021    CR 1.21 01/02/2020    GLC 86 06/14/2021    GLC 95 01/02/2020     COAGS: No results found for: PTT, INR, FIBR  POC: No results found for: BGM, HCG, HCGS  HEPATIC:   Lab Results   Component Value Date    ALBUMIN 4.4 11/24/2010    PROTTOTAL 7.2 11/24/2010    ALT 21 11/24/2010    AST 19 11/24/2010    ALKPHOS 64 11/24/2010    BILITOTAL 2.7 (H) 11/24/2010     OTHER:   Lab Results   Component Value Date    ELGIN 9.2 06/14/2021    TSH 0.20 (L) 11/24/2010    T4 1.08 11/24/2010    T3 114 11/24/2010       Anesthesia Plan    ASA Status:  2      Anesthesia Type: General.     - Airway: ETT   Induction: Intravenous, Propofol.   Maintenance: Balanced.        Consents    Anesthesia Plan(s) and associated risks, benefits, and realistic alternatives discussed. Questions answered and  patient/representative(s) expressed understanding.     - Discussed with:  Patient      - Extended Intubation/Ventilatory Support Discussed: No.      - Patient is DNR/DNI Status: No    Use of blood products discussed: Yes.     - Discussed with: Patient.     - Consented: consented to blood products            Reason for refusal: other.     Postoperative Care    Pain management: IV analgesics.   PONV prophylaxis: Ondansetron (or other 5HT-3), Dexamethasone or Solumedrol     Comments:                Rashid Bravo MD

## 2021-06-16 NOTE — OP NOTE
Procedure Date: 06/16/2021    PREOPERATIVE DIAGNOSES:  Right L4 to L5 herniated disk and lateral recess stenosis with radiculopathy.    POSTOPERATIVE DIAGNOSES:  Right L4 to L5 herniated disk and lateral recess stenosis with radiculopathy.    PROCEDURES PERFORMED:  Right L4 to L5 minimally invasive hemilaminectomy, medial facetectomy and microdiskectomy.    SURGEON:  Rickey Saul MD    ASSISTANT:  Mary Alice Mcconnell PA-C    ANESTHESIA:  General endotracheal anesthesia.    ESTIMATED BLOOD LOSS:  10 mL.    INDICATIONS FOR PROCEDURE:  The patient is a 40-year-old male with right lower extremity pain, has failed conservative measures with lateral recess stenosis from facet hypertrophy as well as herniated disk at that level.  He was brought to the operating room for decompression and diskectomy. Please note that Mary Alice Mcconnell PA-C's assistance was needed for positioning, retraction, suctioning, and closure.     DESCRIPTION OF PROCEDURE:  The patient was brought to the operating room.  General endotracheal anesthesia was induced.  The patient was rolled in the prone position on the Kiko frame.  The back was prepped and draped in sterile fashion.  C-arm fluoroscopy was used to localize the appropriate level and a right-sided approach was performed using the [employer] tubular dilating system.  Level was again confirmed with fluoroscopy.  The microscope was sterilely draped and brought into the field.  A high-speed drill was used to perform a laminotomy and medial facetectomy on the right at L4 to L5.  The ligamentum flavum was elevated.  The nerve root was noted to be quite compressed by the medial facet and ligamentum flavum.  Once this was done and the nerve root was decompressed dorsally, the nerve root was retracted medially and the disk space was opened with a 15 blade scalpel.  Multiple small disk fragments were removed from underneath the nerve.  Once this was felt to be well decompressed and hemostasis was achieved, the  fascia was closed with 0 Vicryl, 2-0 inverted interrupted Vicryl was used for subcutaneous layer and a 4-0 subcuticular Monocryl was used for skin with Exofin as a dressing.  The patient was awakened, extubated, and taken to the recovery room in good condition.    Rickey Saul MD        D: 2021   T: 2021   MT: [id number]    Name:     LUC GREYPrimitivo  MRN:      [id number]        Account:        [id number]   :      1980           Procedure Date: 2021     Document: [id number]

## 2021-06-16 NOTE — ANESTHESIA POSTPROCEDURE EVALUATION
Patient: Anival Moore    Procedure(s):  Right Lumbar 4-5 minimally invasive hemilaminectomy and microdiscectomy    Diagnosis:Herniation of lumbar intervertebral disc with radiculopathy [M51.16]  Diagnosis Additional Information: No value filed.    Anesthesia Type:  General    Note:     Postop Pain Control: Uneventful            Sign Out: Well controlled pain   PONV: No   Neuro/Psych: Uneventful            Sign Out: Acceptable/Baseline neuro status   Airway/Respiratory: Uneventful            Sign Out: Acceptable/Baseline resp. status   CV/Hemodynamics: Uneventful            Sign Out: Acceptable CV status; No obvious hypovolemia; No obvious fluid overload   Other NRE: NONE   DID A NON-ROUTINE EVENT OCCUR? No           Last vitals:  Vitals:    06/16/21 1232 06/16/21 1240 06/16/21 1300   BP:  138/83 138/83   Pulse:  95 84   Resp: 16 14 16   Temp:  97.5  F (36.4  C) 97.4  F (36.3  C)   SpO2:  96% 95%       Last vitals prior to Anesthesia Care Transfer:  CRNA VITALS  6/16/2021 1017 - 6/16/2021 1117      6/16/2021             Pulse:  75    SpO2:  96 %    Resp Rate (observed):  (!) 2          Electronically Signed By: Rashid Bravo MD  June 16, 2021  4:54 PM

## 2021-06-16 NOTE — BRIEF OP NOTE
[facility]    Brief Operative Note    Pre-operative diagnosis: Herniation of lumbar intervertebral disc with radiculopathy [M51.16]  Post-operative diagnosis Same as pre-operative diagnosis    Procedure: Procedure(s):  Right Lumbar 4-5 minimally invasive hemilaminectomy and microdiscectomy  Surgeon: Surgeon(s) and Role:     * Rickey Saul MD - Primary     * Mary Alice Mcconnell PA-C - Assisting     * Mikey Guo PA-C - Assisting  Anesthesia: General   Estimated blood loss: 10ml  Drains: None  Specimens: * No specimens in log *  Findings:   None.  Complications: None .  Implants: * No implants in log *    [id number]

## 2021-06-17 ENCOUNTER — TELEPHONE (OUTPATIENT)
Dept: NEUROSURGERY | Facility: CLINIC | Age: 41
End: 2021-06-17

## 2021-06-17 NOTE — TELEPHONE ENCOUNTER
Post-Op Call    DOS: 6/16/21  Surgery: Right L4 to L5 minimally invasive hemilaminectomy, medial facetectomy and microdiskectomy  Surgeon: Dr. Saul    Called patient for post-operative follow-up.     Patient is having some incisional pain and his back is sore. Discussed weaning from pain medication as pain improves. Provided education about maximum acetaminophen dose in 24 hours from all sources.     Patient is walking without difficulty. Encouraged short, frequent walks as tolerated.     Patient has not had a bowel movement since surgery. Discussed reasons of constipation after surgery and reinforced treatment options.     Patient's does not have a dressing. Patient denies any signs or symptoms of infection. Incision care reinforced.     Reviewed follow up appointments and clinic contact information. Patient will call with any questions or concerns.

## 2021-07-20 VITALS — BODY MASS INDEX: 24.46 KG/M2 | WEIGHT: 181.6 LBS

## 2021-07-20 VITALS — WEIGHT: 184 LBS | BODY MASS INDEX: 24.78 KG/M2

## 2021-07-20 VITALS — WEIGHT: 176.19 LBS | BODY MASS INDEX: 23.73 KG/M2

## 2021-07-20 NOTE — TELEPHONE ENCOUNTER
Who is calling:  Patient  Reason for Call:  Calling to see what the status is of this refill request. He states that he has 2 capsules left and is aware that he is due for an office visit. Patient was sent to schedule to make an appointment with Jt Kraus MD for as soon as available.    Will provider consider approving at least a 30-90 day supply to cover patient until he can be seen?    Date of last appointment with primary care: 1/2/2018  Okay to leave a detailed message: Yes

## 2021-07-20 NOTE — PROGRESS NOTES
Aakash comes in today for follow-up of his depression.  He is currently on Effexor at 150 mg per day and is tolerating this well.  This is treating his depression exceptionally well as well.  No side effects.  He is looking for a letter stating that his depression is under good control for a custody hearing that he is having.    Objective: Vital signs are as per the EMR.  General the patient is alert pleasant and in no acute distress.  He appears healthy.    Assessment and plan: Depression, well controlled.  Continue Effexor.  We will refill this as needed.  A letter was written for him as well.  Follow-up as needed.

## 2021-07-20 NOTE — TELEPHONE ENCOUNTER
Patient Returning Call  Reason for call:  Patient called back.  Information relayed to patient:  Informed of message listed below.  Patient states understanding and was transferred to scheduling.  Patient has additional questions:  No  If YES, what are your questions/concerns:    Okay to leave a detailed message?: No call back needed

## 2021-07-20 NOTE — TELEPHONE ENCOUNTER
"Order pended for review, did select an option with the prescription type listed as \"Generic Rx\".       Geraldine Gaston, [facility]  4:26 PM  7/24/2019    "

## 2021-07-20 NOTE — TELEPHONE ENCOUNTER
Refill Request  Did you contact pharmacy: Yes  Medication name:   Requested Prescriptions     Pending Prescriptions Disp Refills     venlafaxine (EFFEXOR-XR) 150 MG 24 hr capsule 90 capsule 3     Sig: TAKE 1 CAPSULE(150 MG) BY MOUTH DAILY     Who prescribed the medication: Jt Kraus MD  Requested Pharmacy: [facility]  Is patient out of medication: Yes  Patient notified refills processed in 3 business days:  yes  Okay to leave a detailed message: yes

## 2021-07-20 NOTE — PROGRESS NOTES
Clinic Note    Assessment:     Assessment and Plan:    1. Erectile dysfunction, unspecified erectile dysfunction type  He has used Viagra in the past with success.  He like a generic formulation today.  - sildenafil (REVATIO) 20 mg tablet; Take two tablets one hour before sexual actiity  Dispense: 30 tablet; Refill: 1    2. Encounter for screening examination for sexually transmitted disease  At the last second, he changed his mind about proceeding with STI testing today.  I will cancel the orders.  I encouraged him to schedule an appointment should he change his mind  - Chlamydia trachomatis & Neisseria gonorrhoeae, Amplified Detection  - Herpes Simplex Virus (HSV) IgM Antibody(HSIM)  - Syphilis Screen, Cascade  - Hepatitis C Antibody (Anti-HCV)  - HIV Antigen/Antibody Screening Cascade  - Hepatitis B Surface Antigen (HBsAG)    3. Moderate episode of recurrent major depressive disorder (H)  PHQ 9 is 0 today.  He denies any suicidal or homicidal ideation.  Refills of his medication were sent in today.  - venlafaxine (EFFEXOR-XR) 150 MG 24 hr capsule; TAKE 1 CAPSULE(150 MG) BY MOUTH DAILY  Dispense: 90 capsule; Refill: 0       Patient Instructions   Urine sample and blood work today to evaluate for STDs.    Generic Viagra (sildenafil/Revatio) sent into pharmacy.  Take 2 tablets 1 hour before sexual activity.    Refills of venlafaxine sent into pharmacy.    PHQ-9 depression scale completed today.    Follow-up with Dr. Jt Kraus for annual physical in 6 months.  Sooner, if needed.    Return in about 6 months (around 1/23/2020).         Subjective:      Patient comes to the clinic today for medication check.    He needs refills of his venlafaxine.  He uses 150 mg daily.  His PHQ 9 today is 0.  He denies any suicidal or homicidal ideation.  The medication is been working well for him for many years, he takes it as prescribed.    He like STD testing today.  Chart review shows that he had STD testing in 2015, with  negative results.  He does have a few different to sexual partners.  He did not follow through with a vasectomy that he discussed with Dr. Jt Kraus at a prior appointment.  He is not currently having any STI symptoms.  Denies dysuria.    He would like a medication for erectile dysfunction.  He used Viagra in the past with great success.  He would like a generic version of the medication today.  He is not on any nitrates for chest pain.  His blood pressure has been well controlled.  He denies any chest pain or shortness of breath.  No history of diabetes or hyperlipidemia.  He denies any saddle anesthesia or bowel/bladder incontinence.    The following portions of the patient's history were reviewed and updated as appropriate: Allergies, medications, problems, prior note.    Review of Systems:    Review is otherwise negative except for what is mentioned above.     Social Hx:    Social History     Tobacco Use   Smoking Status Never Smoker   Smokeless Tobacco Never Used         Objective:     Vitals:    07/23/19 0937   BP: 120/78   Patient Site: Left Arm   Patient Position: Sitting   Cuff Size: Adult Regular   Pulse: 82   Weight: 176 lb 3 oz (79.9 kg)       Exam:    General: No apparent distress. Calm. Alert and Oriented X3. Pt behavior is appropriate.      Patient Active Problem List   Diagnosis     Chronic Major Depression     Current Outpatient Medications   Medication Sig Dispense Refill     venlafaxine (EFFEXOR-XR) 150 MG 24 hr capsule TAKE 1 CAPSULE(150 MG) BY MOUTH DAILY 90 capsule 0     venlafaxine (EFFEXOR-XR) 150 MG 24 hr capsule TAKE 1 CAPSULE(150 MG) BY MOUTH DAILY 90 capsule 0     sildenafil (REVATIO) 20 mg tablet Take two tablets one hour before sexual actiity 30 tablet 1     No current facility-administered medications for this visit.            Chapito Gilman CNP (Rob)    7/23/2019

## 2021-07-20 NOTE — TELEPHONE ENCOUNTER
RN cannot approve Refill Request    RN can NOT refill this medication Protocol failed and NO refill given.      Altagracia Mckinley, [facility] Triage/Med Refill 7/21/2020    Requested Prescriptions   Pending Prescriptions Disp Refills     venlafaxine (EFFEXOR-XR) 150 MG 24 hr capsule [Pharmacy Med Name: VENLAFAXINE 150MG ER CAPSULES] 90 capsule 0     Sig: TAKE 1 CAPSULE(150 MG) BY MOUTH DAILY       Venlafaxine/Desvenlafaxine Refill Protocol Failed - 7/20/2020  2:10 PM        Failed - LFT or AST or ALT in last year     No results found for: ALBUMIN, BILITOT, BILIDIR, ALKPHOS, AST, ALT, PROT             Failed - Fasting lipid cascade in last year     No results found for: CHOL, TRIG, HDL, LDLCALC, FASTING          Passed - PCP or prescribing provider visit in last year     Last office visit with prescriber/PCP: 1/2/2018 Jt Kraus MD OR same dept: 7/23/2019 Chapito Gilman CNP OR same specialty: 7/23/2019 Chapito Gilman CNP  Last physical: Visit date not found Last MTM visit: Visit date not found   Next visit within 3 mo: Visit date not found  Next physical within 3 mo: Visit date not found  Prescriber OR PCP: Jt Kraus MD  Last diagnosis associated with med order: 1. Moderate episode of recurrent major depressive disorder (H)  - venlafaxine (EFFEXOR-XR) 150 MG 24 hr capsule [Pharmacy Med Name: VENLAFAXINE 150MG ER CAPSULES]; TAKE 1 CAPSULE(150 MG) BY MOUTH DAILY  Dispense: 90 capsule; Refill: 0    If protocol passes may refill for 12 months if within 3 months of last provider visit (or a total of 15 months).             Passed - Blood Pressure in last year     BP Readings from Last 1 Encounters:   07/23/19 120/78

## 2021-07-20 NOTE — TELEPHONE ENCOUNTER
Medication Question or Clarification  Who is calling: Patient  What medication are you calling about? (include dose and sig) Sildenafil 20 mg tablet .  Who prescribed the medication?: Jt Kraus MD  What is your question/concern?: Patient is requesting to change the prescription to generic because his insurance will not cover the cost of medication.   Pharmacy: [id number]  Okay to leave a detailed message?: No  Site CMT - Please call the pharmacy to obtain any additional needed information.

## 2021-07-20 NOTE — PATIENT INSTRUCTIONS - HE
Urine sample and blood work today to evaluate for STDs.    Generic Viagra (sildenafil/Revatio) sent into pharmacy.  Take 2 tablets 1 hour before sexual activity.    Refills of venlafaxine sent into pharmacy.    PHQ-9 depression scale completed today.    Follow-up with Dr. Jt Kraus for annual physical in 6 months.  Sooner, if needed.

## 2021-07-20 NOTE — TELEPHONE ENCOUNTER
RN cannot approve Refill Request    RN can NOT refill this medication overdue for office visits and/or labs.    Lorenzo Neff, [facility] Triage/Med Refill 4/29/2019    Requested Prescriptions   Pending Prescriptions Disp Refills     venlafaxine (EFFEXOR-XR) 150 MG 24 hr capsule [Pharmacy Med Name: VENLAFAXINE ER 150MG CAPSULES] 90 capsule 0     Sig: TAKE 1 CAPSULE(150 MG) BY MOUTH DAILY       Venlafaxine/Desvenlafaxine Refill Protocol Failed - 4/27/2019  8:38 AM        Failed - LFT or AST or ALT in last year     No results found for: ALBUMIN, BILITOT, BILIDIR, ALKPHOS, AST, ALT, PROT             Failed - Fasting lipid cascade in last year     No results found for: CHOL, TRIG, HDL, LDLCALC, FASTING          Failed - PCP or prescribing provider visit in last year     Last office visit with prescriber/PCP: 1/2/2018 Jt Kraus MD OR same dept: Visit date not found OR same specialty: 1/2/2018 Jt Kraus MD  Last physical: Visit date not found Last MTM visit: Visit date not found   Next visit within 3 mo: Visit date not found  Next physical within 3 mo: Visit date not found  Prescriber OR PCP: Jt Kraus MD  Last diagnosis associated with med order: There are no diagnoses linked to this encounter.  If protocol passes may refill for 12 months if within 3 months of last provider visit (or a total of 15 months).             Failed - Blood Pressure in last year     BP Readings from Last 1 Encounters:   01/02/18 128/80

## 2021-07-20 NOTE — TELEPHONE ENCOUNTER
RN cannot approve Refill Request    RN can NOT refill this medication Protocol failed and NO refill given. Last office visit: 1/2/2018 Jt Kraus MD Last Physical: Visit date not found Last MTM visit: Visit date not found Last visit same specialty: 7/23/2019 Chapito Gilman CNP.  Next visit within 3 mo: Visit date not found  Next physical within 3 mo: Visit date not found      Roopa Espinoza, [facility] Triage/Med Refill 12/1/2020    Requested Prescriptions   Pending Prescriptions Disp Refills     venlafaxine (EFFEXOR-XR) 150 MG 24 hr capsule 90 capsule 3     Sig: TAKE 1 CAPSULE(150 MG) BY MOUTH DAILY       Venlafaxine/Desvenlafaxine Refill Protocol Failed - 12/1/2020  3:36 PM        Failed - LFT or AST or ALT in last year     No results found for: ALBUMIN, BILITOT, BILIDIR, ALKPHOS, AST, ALT, PROT             Failed - Fasting lipid cascade in last year     No results found for: CHOL, TRIG, HDL, LDLCALC, FASTING          Failed - PCP or prescribing provider visit in last year     Last office visit with prescriber/PCP: 1/2/2018 Jt Kraus MD OR same dept: Visit date not found OR same specialty: 7/23/2019 Chapito Gilman CNP  Last physical: Visit date not found Last MTM visit: Visit date not found   Next visit within 3 mo: Visit date not found  Next physical within 3 mo: Visit date not found  Prescriber OR PCP: Jt Kraus MD  Last diagnosis associated with med order: 1. Moderate episode of recurrent major depressive disorder (H)  - venlafaxine (EFFEXOR-XR) 150 MG 24 hr capsule; TAKE 1 CAPSULE(150 MG) BY MOUTH DAILY  Dispense: 90 capsule; Refill: 3    If protocol passes may refill for 12 months if within 3 months of last provider visit (or a total of 15 months).             Failed - Blood Pressure in last year     BP Readings from Last 1 Encounters:   07/23/19 120/78

## 2021-07-20 NOTE — TELEPHONE ENCOUNTER
Left voicemail for patient to return call to clinic. When patient returns call, please give them below message.    Patient is due for f/u with Dr. Kraus. We can get patient a refill once he is scheduled. Please help patient schedule appointment.  June Melchor CMA ............... 2:17 PM, 12/02/20

## 2021-07-20 NOTE — PROGRESS NOTES
"ASSESSMENT:    Depression, well controlled.    PLAN:  Refill Effexor for a year.  He will follow-up at that time, earlier if needed.  Problem List Items Addressed This Visit     None          There are no discontinued medications.    No Follow-up on file.    There are no Patient Instructions on file for this visit.    CHIEF COMPLAINT:  Chief Complaint   Patient presents with     Follow-up     Depression, med check       HISTORY OF PRESENT ILLNESS:  Anival Moore is a 36 y.o. male presenting to the clinic today for follow up for his depression. His depression is currently well controlled on 150 mg of Effexor daily. He is tolerating Effexor well and denies any side effects. He has attempted discontinuing Effexor on multiple occasions since his last office visit on 12/24/15, but has noted significant recurrence of his depression symptoms and headache side effects as a result. He clarifies that his depression symptoms typically begin re-presenting approximately 48 hours after going off of Effexor. He requests a refill of 150 mg Effexor at this time.     REVIEW OF SYSTEMS:   He has developed significant headaches while attempting to go off off daily Effexor, which he describes as a \"brain spark\" sensation of intermediate severity between a migraine and a \"normal\" headache. His headaches do not present while on daily Effexor. All other systems are negative.    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 He is currently living in [place].   No Known Allergies    TOBACCO USE:  History   Smoking Status     Never Smoker   Smokeless Tobacco     Never Used       VITALS:  Vitals:    03/06/17 0817   BP: 112/78   Patient Site: Right Arm   Patient Position: Sitting   Cuff Size: Adult Regular   Pulse: 76   Weight: 181 lb 9.6 oz (82.4 kg)     Wt Readings from Last 3 Encounters:   03/06/17 181 lb 9.6 oz (82.4 kg)   12/24/15 166 lb 11.2 oz (75.6 kg)         PHYSICAL " EXAM:  Constitutional:  Reveals an alert and oriented x3, pleasant male with no acute distress.       ADDITIONAL HISTORY SUMMARIZED (FROM OLD RECORDS OR HISTORY FROM SOMEONE OTHER THAN THE PATIENT OR ANOTHER HEALTHCARE PROVIDER), COLONOSCOPY (2 TOTAL): none    DECISION TO OBTAIN EXTRA INFORMATION (OLD RECORDS REQUESTED OR HISTORY FROM ANOTHER PERSON OR ACCESSING CARE EVERYWHERE) (1 TOTAL):none    RADIOLOGY TESTS SUMMARIZED OR ORDERED (XRAY/CT/MRI/DXA/MAMMO) (1 TOTAL): none    LABS REVIEWED OR ORDERED (1 TOTAL): None.    MEDICINE TESTS SUMMARIZED OR ORDERED (EKG/ECHO/EGD) (1 TOTAL): none    INDEPENDENT REVIEW OF EKG OR X-RAY (2 EACH): none      The visit lasted a total of 4 minutes face to face with the patient. Over 50% of the time was spent counseling and educating the patient about depression.    IAlfonso, am scribing for and in the presence of, Dr. Jt Kraus.    I, Dr. Jt Kraus, personally performed the services described in this documentation, as scribed by Alfonso George in my presence, and it is both accurate and complete.    MEDICATIONS:  Current Outpatient Prescriptions   Medication Sig Dispense Refill     venlafaxine (EFFEXOR-XR) 75 MG 24 hr capsule Take 1 capsule (75 mg total) by mouth daily. 90 capsule 1     No current facility-administered medications for this visit.        Total data points: 0

## 2021-07-20 NOTE — TELEPHONE ENCOUNTER
RN cannot approve Refill Request    RN can NOT refill this medication Protocol failed and NO refill given. Last office visit: 7/23/2019 Chapito Gilman CNP Last Physical: Visit date not found Last MTM visit: Visit date not found Last visit same specialty: 7/23/2019 Chapito Gilman CNP.  Next visit within 3 mo: Visit date not found  Next physical within 3 mo: Visit date not found      Manuela Coughlin, [facility] Triage/Med Refill 3/20/2020    Requested Prescriptions   Pending Prescriptions Disp Refills     venlafaxine (EFFEXOR-XR) 150 MG 24 hr capsule [Pharmacy Med Name: VENLAFAXINE ER 150MG CAPSULES] 90 capsule 0     Sig: TAKE 1 CAPSULE(150 MG) BY MOUTH DAILY       Venlafaxine/Desvenlafaxine Refill Protocol Failed - 3/19/2020  3:46 AM        Failed - LFT or AST or ALT in last year     No results found for: ALBUMIN, BILITOT, BILIDIR, ALKPHOS, AST, ALT, PROT             Failed - Fasting lipid cascade in last year     No results found for: CHOL, TRIG, HDL, LDLCALC, FASTING          Passed - PCP or prescribing provider visit in last year     Last office visit with prescriber/PCP: 7/23/2019 Chapito Gilman CNP OR same dept: 7/23/2019 Chapito Gilman CNP OR same specialty: 7/23/2019 Chapito Gilman CNP  Last physical: Visit date not found Last MTM visit: Visit date not found   Next visit within 3 mo: Visit date not found  Next physical within 3 mo: Visit date not found  Prescriber OR PCP: Chapito Gilman CNP  Last diagnosis associated with med order: 1. Moderate episode of recurrent major depressive disorder (H)  - venlafaxine (EFFEXOR-XR) 150 MG 24 hr capsule [Pharmacy Med Name: VENLAFAXINE ER 150MG CAPSULES]; TAKE 1 CAPSULE(150 MG) BY MOUTH DAILY  Dispense: 90 capsule; Refill: 0    If protocol passes may refill for 12 months if within 3 months of last provider visit (or a total of 15 months).             Passed - Blood Pressure in last year     BP Readings from Last 1 Encounters:   07/23/19 120/78

## 2021-07-20 NOTE — TELEPHONE ENCOUNTER
Medication Request  Medication name: Venafaxaline-patient scheduled for first office visit on 12/10 a 1pm and was told if he made an office visit he could get refill of his medication as he will be out 12/3  Requested Pharmacy: [facility]   Reason for request: See above   When did you use medication last?: Today, 12/2  Patient offered appointment:  Yes, he is scheduled on 12/10 at 1pm with his provider Jt Kraus   Okay to leave a detailed message:Yes

## 2021-08-03 PROBLEM — M54.16 RIGHT LUMBAR RADICULOPATHY: Status: RESOLVED | Noted: 2021-03-30 | Resolved: 2021-08-03

## 2021-09-30 ENCOUNTER — TELEPHONE (OUTPATIENT)
Dept: INTERNAL MEDICINE | Facility: CLINIC | Age: 41
End: 2021-09-30

## 2021-09-30 DIAGNOSIS — N52.9 ERECTILE DYSFUNCTION, UNSPECIFIED ERECTILE DYSFUNCTION TYPE: Primary | ICD-10-CM

## 2021-09-30 RX ORDER — SILDENAFIL CITRATE 20 MG/1
20-100 TABLET ORAL DAILY PRN
Qty: 30 TABLET | Refills: 0 | Status: SHIPPED | OUTPATIENT
Start: 2021-09-30 | End: 2022-09-21

## 2021-09-30 NOTE — TELEPHONE ENCOUNTER
"Reason for Call:  Other     Detailed comments: Patient calling to see if you can \"set him up with Sildenafil\"  Last prescribed in 2019 for erectile dysfunction.    sildenafil (REVATIO) 20 mg tablet     [facility]    Phone Number Patient can be reached at: Home number on file [phone] (home)    Best Time: any    Can we leave a detailed message on this number? YES    Call taken on 9/30/2021 at 3:23 PM by Laura L Goldberg, ARRT      "

## 2022-01-03 DIAGNOSIS — F33.1 MODERATE EPISODE OF RECURRENT MAJOR DEPRESSIVE DISORDER (H): Primary | ICD-10-CM

## 2022-01-04 RX ORDER — VENLAFAXINE HYDROCHLORIDE 150 MG/1
CAPSULE, EXTENDED RELEASE ORAL
Qty: 90 CAPSULE | Refills: 3 | Status: SHIPPED | OUTPATIENT
Start: 2022-01-04 | End: 2022-03-17

## 2022-01-04 NOTE — TELEPHONE ENCOUNTER
Patient called and is wondering if his Rx has been approved. Patient is going through withdraws and needs his medication approved by PCP. Patient states he has an appointment on 01/27/2022. Please call patient and let him know when his Rx is sent to the pharmacy.    Philomena Reynoso

## 2022-03-17 ENCOUNTER — OFFICE VISIT (OUTPATIENT)
Dept: INTERNAL MEDICINE | Facility: CLINIC | Age: 42
End: 2022-03-17
Payer: COMMERCIAL

## 2022-03-17 VITALS
TEMPERATURE: 97.6 F | HEART RATE: 86 BPM | SYSTOLIC BLOOD PRESSURE: 116 MMHG | WEIGHT: 205 LBS | OXYGEN SATURATION: 96 % | BODY MASS INDEX: 27.17 KG/M2 | DIASTOLIC BLOOD PRESSURE: 80 MMHG | HEIGHT: 73 IN

## 2022-03-17 DIAGNOSIS — F33.1 MODERATE EPISODE OF RECURRENT MAJOR DEPRESSIVE DISORDER (H): ICD-10-CM

## 2022-03-17 DIAGNOSIS — F11.90 OPIOID USE DISORDER: ICD-10-CM

## 2022-03-17 DIAGNOSIS — Z00.00 ENCOUNTER FOR ROUTINE ADULT MEDICAL EXAMINATION: Primary | ICD-10-CM

## 2022-03-17 LAB
CHOLEST SERPL-MCNC: 208 MG/DL
FASTING STATUS PATIENT QL REPORTED: ABNORMAL
FASTING STATUS PATIENT QL REPORTED: NORMAL
GLUCOSE BLD-MCNC: 123 MG/DL (ref 70–125)
HDLC SERPL-MCNC: 33 MG/DL
LDLC SERPL CALC-MCNC: ABNORMAL MG/DL
TRIGL SERPL-MCNC: 414 MG/DL

## 2022-03-17 PROCEDURE — 80061 LIPID PANEL: CPT | Performed by: INTERNAL MEDICINE

## 2022-03-17 PROCEDURE — 99396 PREV VISIT EST AGE 40-64: CPT | Performed by: INTERNAL MEDICINE

## 2022-03-17 PROCEDURE — 36415 COLL VENOUS BLD VENIPUNCTURE: CPT | Performed by: INTERNAL MEDICINE

## 2022-03-17 PROCEDURE — 82947 ASSAY GLUCOSE BLOOD QUANT: CPT | Performed by: INTERNAL MEDICINE

## 2022-03-17 RX ORDER — VENLAFAXINE HYDROCHLORIDE 150 MG/1
CAPSULE, EXTENDED RELEASE ORAL
Qty: 90 CAPSULE | Refills: 3 | Status: SHIPPED | OUTPATIENT
Start: 2022-03-17 | End: 2023-02-26

## 2022-03-17 RX ORDER — BUPRENORPHINE AND NALOXONE 8; 2 MG/1; MG/1
FILM, SOLUBLE BUCCAL; SUBLINGUAL
COMMUNITY
Start: 2022-03-16 | End: 2023-03-28

## 2022-03-17 NOTE — LETTER
March 24, 2022      Anival JULIANNE Moore  [address] [facility] [address]        Dear ,    We are writing to inform you of your test results.    Aakash- Cholesterol and glucose are up a bit, but nothing we would need medication for at this point.  Weight loss would help both of these parameters    GMJ  Resulted Orders   Lipid Profile (Chol, Trig, HDL, LDL calc)   Result Value Ref Range    Cholesterol 208 (H) <=199 mg/dL    Triglycerides 414 (H) <=149 mg/dL    Direct Measure HDL 33 (L) >=40 mg/dL      Comment:      HDL Cholesterol Reference Range:     0-2 years:   No reference ranges established for patients under 2 years old  at [facility] [address] for lipid analytes.    2-8 years:  Greater than 45 mg/dL     18 years and older:   Female: Greater than or equal to 50 mg/dL   Male:   Greater than or equal to 40 mg/dL    LDL Cholesterol Calculated        Comment:      Cannot estimate LDL when triglyceride exceeds 400 mg/dL    Patient Fasting > 8hrs? Unknown    Glucose   Result Value Ref Range    Glucose 123 70 - 125 mg/dL    Patient Fasting > 8hrs? Unknown        If you have any questions or concerns, please call the clinic at the number listed above.       Sincerely,      Jt Kraus MD

## 2022-03-17 NOTE — PROGRESS NOTES
SUBJECTIVE:   CC: Anival Moore is an 41 year old male who presents for preventative health visit.     Aakash comes in today for his yearly physical.  It has been about 15 months since I had seen him last.  Over this timeframe, he developed some low back pain along with radiculopathy.  Ultimately this necessitated a laminectomy and a microdiscectomy in June 2021.  Before the surgery, however, he was prescribed opiates for pain control and unfortunately became addicted to them, to the point where he was using heroin or fentanyl on a daily basis.  He stopped using 2 weeks ago and is in a Suboxone clinic through the [facility].  He states that he is through the worst of his cravings and he has been well over the last few days.  I congratulated him on his choice to get clean.  History of depression, currently on 150 mg of Effexor per day.  Tolerating this well and continues to be effective for him.        Healthy Habits:     Getting at least 3 servings of Calcium per day:  NO    Bi-annual eye exam:  NO    Dental care twice a year:  Yes    Sleep apnea or symptoms of sleep apnea:  None    Diet:  Regular (no restrictions)    Frequency of exercise:  None    Taking medications regularly:  Yes    Medication side effects:  None    PHQ-2 Total Score: 0    Additional concerns today:  No      Today's PHQ-2 Score:   PHQ-2 ( 1999 Pfizer) 3/17/2022   Q1: Little interest or pleasure in doing things 0   Q2: Feeling down, depressed or hopeless 0   PHQ-2 Score 0   PHQ-2 Total Score (12-17 Years)- Positive if 3 or more points; Administer PHQ-A if positive -   Q1: Little interest or pleasure in doing things Not at all   Q2: Feeling down, depressed or hopeless Not at all   PHQ-2 Score 0       Abuse: Current or Past(Physical, Sexual or Emotional)- NA  Do you feel safe in your environment? Yes    Have you ever done Advance Care Planning? (For example, a Health Directive, POLST, or a discussion with a medical provider or your loved  ones about your wishes): No, advance care planning information given to patient to review.  Patient declined advance care planning discussion at this time.    Social History     Tobacco Use     Smoking status: Light Tobacco Smoker     Smokeless tobacco: Never Used   Substance Use Topics     Alcohol use: Yes     Alcohol/week: 0.0 standard drinks     Comment: occasional 1x/few months.         Alcohol Use 3/17/2022   Prescreen: >3 drinks/day or >7 drinks/week? No       Last PSA: No results found for: PSA      OBJECTIVE:   Wt 93 kg (205 lb)   BMI 27.80 kg/m      Physical Exam  GENERAL: healthy, alert and no distress  EYES: Eyes grossly normal to inspection, PERRL and conjunctivae and sclerae normal  HENT: ear canals and TM's normal, nose and mouth without ulcers or lesions  NECK: no adenopathy, no asymmetry, masses, or scars and thyroid normal to palpation  RESP: lungs clear to auscultation - no rales, rhonchi or wheezes  CV: regular rate and rhythm, normal S1 S2, no S3 or S4, no murmur, click or rub, no peripheral edema and peripheral pulses strong  ABDOMEN: soft, nontender, no hepatosplenomegaly, no masses and bowel sounds normal  MS: no gross musculoskeletal defects noted, no edema  SKIN: no suspicious lesions or rashes  NEURO: Normal strength and tone, mentation intact and speech normal  PSYCH: mentation appears normal, affect normal/bright    Diagnostic Test Results:  Labs reviewed in Epic    ASSESSMENT/PLAN:   (Z00.00) Encounter for routine adult medical examination  (primary encounter diagnosis)  Comment: Vaccinations are up-to-date.  Colon cancer screening will begin at age 45.  Prostate cancer screening at age 50.  Check labs as below.  Follow-up 1 year, earlier if needed.  Plan: Lipid Profile (Chol, Trig, HDL, LDL calc),         Glucose            (F33.1) Moderate episode of recurrent major depressive disorder (H)  Comment: Continue venlafaxine.  This was refilled today.  Plan: venlafaxine (EFFEXOR-XR) 150 MG  24 hr capsule            (F11.90) Opioid use disorder  Comment: Continue follow-up at health [facility] [place] as scheduled.  Plan:     Patient has been advised of split billing requirements and indicates understanding: Yes    Estimated body mass index is 27.8 kg/m  as calculated from the following:    Height as of 6/16/21: 1.829 m (6').    Weight as of this encounter: 93 kg (205 lb).       He reports that he has been smoking. He has never used smokeless tobacco.        Counseling Resources:  ATP IV Guidelines  Pooled Cohorts Equation Calculator  FRAX Risk Assessment  ICSI Preventive Guidelines  Dietary Guidelines for Americans, 2010  USDA's MyPlate  ASA Prophylaxis  Lung CA Screening    Jt Kraus MD  [facility]

## 2022-09-21 DIAGNOSIS — N52.9 ERECTILE DYSFUNCTION, UNSPECIFIED ERECTILE DYSFUNCTION TYPE: ICD-10-CM

## 2022-09-21 RX ORDER — SILDENAFIL CITRATE 20 MG/1
20-100 TABLET ORAL DAILY PRN
Qty: 30 TABLET | Refills: 0 | Status: SHIPPED | OUTPATIENT
Start: 2022-09-21 | End: 2023-11-28

## 2022-09-21 NOTE — TELEPHONE ENCOUNTER
"  Medication Question or Refill    Contacts       Type Contact Phone/Fax    09/21/2022 12:41 PM CDT Phone (Incoming) Anival Moore (Self) [phone] (H)          What medication are you calling about (include dose and sig)?:    Sig - Route: Take 1-5 tablets ( mg) by mouth daily as needed (ED) - Oral  Sent to pharmacy as: Sildenafil Citrate 20 MG Oral Tablet (REVATIO)      Controlled Substance Agreement on file:   CSA -- Patient Level:    CSA: None found at the patient level.       Who prescribed the medication?: Dr. Jt Kraus    Do you need a refill? Yes: out of medications    When did you use the medication last? Yesterday    Patient offered an appointment? No    Do you have any questions or concerns?  Yes: was unaware of the 2-3 day notice for refills.  Patient said \"he is out\"  Patient informed to call pharmacy to check on refill status.    Preferred Pharmacy:     [id number] [id number] [place] [address] [address]  [address] [facility] [address]  Phone: [phone] Fax: [phone]      Okay to leave a detailed message?: Yes at Cell number on file:    Telephone Information:   Mobile [phone]         "

## 2022-09-22 DIAGNOSIS — N52.9 ERECTILE DYSFUNCTION, UNSPECIFIED ERECTILE DYSFUNCTION TYPE: ICD-10-CM

## 2022-09-23 RX ORDER — SILDENAFIL CITRATE 20 MG/1
TABLET ORAL
Qty: 90 TABLET | OUTPATIENT
Start: 2022-09-23

## 2022-10-29 ENCOUNTER — NURSE TRIAGE (OUTPATIENT)
Dept: NURSING | Facility: CLINIC | Age: 42
End: 2022-10-29

## 2022-10-29 NOTE — TELEPHONE ENCOUNTER
Is this a 2nd Level Triage? NO    Situation: Covid Symptoms    Background: Symptoms started on 10/27/2022. Pt tested negative yesterday, and again today    Assessment:   Pt has cough, and congestion, nasal congestion, fatigue, headache, body aches, and .0. Pt denies any difficulty breathing, chest tightness,     Protocol Recommended Disposition:   Home Care     Recommendation:   Per protocol pt should be able to treat these symptoms at home. Pt is interested in a virtual visit to discuss getting antibiotics, and possible Covid PCR test, so was transferred to the scheduling line to schedule an appointment.  Pt was advised to:    CALL BACK IF:   * Fever over 103 F (39.4 C)  * Fever lasts over 3 days  * Fever returns after being gone for 24 hour  * Chest pain or difficulty breathing occurs  * You become worse    Pt verbalized understanding.     Portillo Feliciano RN on 10/29/2022 at 2:44 PM    COVID 19 Nurse Triage Plan/Patient Instructions    Please be aware that novel coronavirus (COVID-19) may be circulating in the community. If you develop symptoms such as fever, cough, or SOB or if you have concerns about the presence of another infection including coronavirus (COVID-19), please contact your health care provider or visit [email or web address].     Disposition/Instructions    Virtual Visit with provider recommended. Reference Visit Selection Guide.    Thank you for taking steps to prevent the spread of this virus.  o Limit your contact with others.  o Wear a simple mask to cover your cough.  o Wash your hands well and often.    Resources    [facility]: About COVID-19: [email or web address]    CDC: What to Do If You're Sick: www.cdc.gov/coronavirus/2019-ncov/about/steps-when-sick.html    CDC: Ending Home Isolation: www.cdc.gov/coronavirus/2019-ncov/hcp/disposition-in-home-patients.html     CDC: Caring for Someone: www.cdc.gov/coronavirus/2019-ncov/if-you-are-sick/care-for-someone.html      [facility]: Interim Guidance for Hospital Discharge to Home: www.health.[facility].[place]/diseases/coronavirus/hcp/hospdischarge.pdf    [facility] clinical trials (COVID-19 research studies): [email or web address]     Below are the COVID-19 hotlines at the [place] Department of Health ([facility]). Interpreters are available.   o For health questions: Call 441-256-8855 or 1-529.647.6648 (7 a.m. to 7 p.m.)  o For questions about schools and childcare: Call 367-966-7537 or 1-945.591.5647 (7 a.m. to 7 p.m.)    Reason for Disposition    [1] COVID-19 infection suspected by caller or triager AND [2] mild symptoms (cough, fever, or others) AND [3] negative COVID-19 rapid test    Additional Information    Negative: SEVERE difficulty breathing (e.g., struggling for each breath, speaks in single words)    Negative: Difficult to awaken or acting confused (e.g., disoriented, slurred speech)    Negative: Bluish (or gray) lips or face now    Negative: Shock suspected (e.g., cold/pale/clammy skin, too weak to stand, low BP, rapid pulse)    Negative: Sounds like a life-threatening emergency to the triager    Negative: [1] Diagnosed or suspected COVID-19 AND [2] symptoms lasting 3 or more weeks    Negative: [1] COVID-19 exposure AND [2] no symptoms    Negative: COVID-19 vaccine reaction suspected (e.g., fever, headache, muscle aches) occurring 1 to 3 days after getting vaccine    Negative: COVID-19 vaccine, questions about    Negative: [1] Lives with someone known to have influenza (flu test positive) AND [2] flu-like symptoms (e.g., cough, runny nose, sore throat, SOB; with or without fever)    Negative: [1] Adult with possible COVID-19 symptoms AND [2] triager concerned about severity of symptoms or other causes    Negative: COVID-19 and breastfeeding, questions about    Negative: SEVERE or constant chest pain or pressure  (Exception: Mild central chest pain, present only when coughing.)    Negative: MODERATE  difficulty breathing (e.g., speaks in phrases, SOB even at rest, pulse 100-120)    Negative: [1] Headache AND [2] stiff neck (can't touch chin to chest)    Negative: Oxygen level (e.g., pulse oximetry) 90 percent or lower    Negative: Chest pain or pressure    Negative: Patient sounds very sick or weak to the triager    Negative: MILD difficulty breathing (e.g., minimal/no SOB at rest, SOB with walking, pulse <100)    Negative: Fever > 103 F (39.4 C)    Negative: [1] Fever > 101 F (38.3 C) AND [2] age > 60 years    Negative: [1] Fever > 100.0 F (37.8 C) AND [2] bedridden (e.g., nursing home patient, CVA, chronic illness, recovering from surgery)    Negative: HIGH RISK for severe COVID complications (e.g., weak immune system, age > 64 years, obesity with BMI > 25, pregnant, chronic lung disease or other chronic medical condition)  (Exception: Already seen by PCP and no new or worsening symptoms.)    Negative: [1] HIGH RISK patient AND [2] influenza is widespread in the community AND [3] ONE OR MORE respiratory symptoms: cough, sore throat, runny or stuffy nose    Negative: [1] HIGH RISK patient AND [2] influenza exposure within the last 7 days AND [3] ONE OR MORE respiratory symptoms: cough, sore throat, runny or stuffy nose    Negative: Oxygen level (e.g., pulse oximetry) 91 to 94 percent    Negative: Fever present > 3 days (72 hours)    Negative: [1] Fever returns after gone for over 24 hours AND [2] symptoms worse or not improved    Negative: [1] Continuous (nonstop) coughing interferes with work or school AND [2] no improvement using cough treatment per Care Advice    Protocols used: CORONAVIRUS (COVID-19) DIAGNOSED OR [id number] 1.18.2022

## 2022-11-10 ENCOUNTER — TELEPHONE (OUTPATIENT)
Dept: NURSING | Facility: CLINIC | Age: 42
End: 2022-11-10

## 2022-11-10 NOTE — TELEPHONE ENCOUNTER
Patient states he needs a note stating that he called the triage line on 10- for his work.    Patient would like this mailed to him.    Please advise.    Philomena Mensah RN on 11/10/2022 at 2:41 PM

## 2023-02-23 DIAGNOSIS — F33.1 MODERATE EPISODE OF RECURRENT MAJOR DEPRESSIVE DISORDER (H): ICD-10-CM

## 2023-02-25 NOTE — TELEPHONE ENCOUNTER
"Routing refill request to provider for review/approval because:  Labs not current:  Cr, phq 9    Last Written Prescription Date:  3/17/22  Last Fill Quantity: 90,  # refills: 3   Last office visit provider:  3/17/22     Requested Prescriptions   Pending Prescriptions Disp Refills     venlafaxine (EFFEXOR XR) 150 MG 24 hr capsule [Pharmacy Med Name: VENLAFAXINE 150MG ER CAPSULES] 90 capsule 3     Sig: TAKE 1 CAPSULE(150 MG) BY MOUTH DAILY       Serotonin-Norepinephrine Reuptake Inhibitors  Failed - 2/23/2023  2:43 PM        Failed - PHQ-9 score of less than 5 in past 6 months     Please review last PHQ-9 score.           Failed - Normal serum creatinine on file in past 12 months     Recent Labs   Lab Test 06/14/21  1128   CR 0.95       Ok to refill medication if creatinine is low          Failed - Recent (6 mo) or future (30 days) visit within the authorizing provider's specialty     Patient had office visit in the last 6 months or has a visit in the next 30 days with authorizing provider or within the authorizing provider's specialty.  See \"Patient Info\" tab in inbasket, or \"Choose Columns\" in Meds & Orders section of the refill encounter.            Passed - Blood pressure under 140/90 in past 12 months     BP Readings from Last 3 Encounters:   03/17/22 116/80   06/16/21 138/83   06/14/21 118/76                 Passed - Medication is active on med list        Passed - Patient is age 18 or older             Altagracia Mckinley RN 02/24/23 6:25 PM  "

## 2023-02-26 RX ORDER — VENLAFAXINE HYDROCHLORIDE 150 MG/1
CAPSULE, EXTENDED RELEASE ORAL
Qty: 90 CAPSULE | Refills: 3 | Status: SHIPPED | OUTPATIENT
Start: 2023-02-26 | End: 2023-11-28

## 2023-03-08 ENCOUNTER — TELEPHONE (OUTPATIENT)
Dept: INTERNAL MEDICINE | Facility: CLINIC | Age: 43
End: 2023-03-08
Payer: COMMERCIAL

## 2023-03-08 ENCOUNTER — NURSE TRIAGE (OUTPATIENT)
Dept: NURSING | Facility: CLINIC | Age: 43
End: 2023-03-08
Payer: COMMERCIAL

## 2023-03-08 NOTE — TELEPHONE ENCOUNTER
3-8-23    Forms/Letter Request    Type of form/letter: Work-[employer]      When is form/letter needed by: soon, pt called stated he is a recovering from heroine for about 1 year.  Pt states he missed his appointment with Dr Randy Hamlin @  2 weeks ago and Dr Hamlin wont fill his suboxone until Anival comes in for an appt, (pt didn't schedule an appt yet)  Pt states hes going to suboxone withdrawals & is now working 60 hours a week & is struggling going through suboxone withdrawals.  Pt is requesting a letter to provide to his missed work for 3-8-23   ATTN:  Maxi Wasserman   (I did trans pt to red line nurse ext)    How would you like the form/letter returned: upload in [employer]    Patient Notified form requests are processed in 3-5 business days:Yes    Okay to leave a detailed message?: Yes at Cell number on file:    Telephone Information:   Mobile [phone]

## 2023-03-08 NOTE — TELEPHONE ENCOUNTER
Call from patient who is going through Suboxone withdrawal. Patient last took Suboxone 4 days ago.    Patient feels his temp fluctuates, feels somewhat anxious and exhausted. No vomiting but does have mild diarrhea    Main reason for patient's call is needing work excuse for today.  He has been working the past couple of days. Patient feels he is unable to work today.    Informed if he wants to be evaluated for treatment of his withdrawals, he would need to be evaluated in the ED.    Advised clinic will call him w/ recommendation.    Antwon Toth RN, BSN  Triage Nurse Advisor      Reason for Disposition    Substance abuse, questions about     Wants work excuse letter    Additional Information    Negative: Coma (e.g., not moving, not talking, not responding to stimuli)    Negative: Difficult to awaken or acting confused (e.g., disoriented, slurred speech)    Negative: Seeing or hearing or feeling things that are not there (i.e., auditory, visual, or tactile hallucinations)    Negative: Slow, shallow and weak breathing    Negative: Seizure    Negative: Violent behavior, or threatening to physically hurt or kill someone    Negative: Sounds like a life-threatening emergency to the triager    Negative: Very strange, paranoid, or confused behavior    Negative: Feeling very shaky (i.e., visible tremors of hands)    Negative: Pregnant and symptoms of narcotic withdrawal (e.g., vomiting, severe muscle cramps)    Negative: SEVERE vomiting (e.g., 6 or more times/day) and present > 8 hours (Exception: patient sounds well, is drinking liquids, does not sound dehydrated, and vomiting has lasted less than 24 hours)    Negative: MODERATE vomiting (e.g., 3 - 5 times/day) and age > 60    Negative: Fever > 100.0 F (37.8 C) and IVDA (intravenous drug abuse)    Negative: Patient sounds very anxious or agitated    Negative: Patient sounds very sick or weak to the triager    Negative: White of the eyes have turned yellow (i.e.,  jaundice)    Negative: Fever > 100.0 F (37.8 C) and diabetes mellitus or weak immune system (e.g., HIV positive, cancer chemo, splenectomy, organ transplant, chronic steroids)    Negative: Requesting help for narcotic (opioid) withdrawal symptoms (e.g., anxiety, body aches, diarrhea, gooseflesh, sweating, vomiting)    Negative: Patient wants to be seen    Negative: Alcohol or drug abuse, known or suspected    Negative: Inhalant abuse (e.g., huffing), known or suspected    Negative: Requesting admission for substance (drug) abuse    Negative: Requesting to talk with a counselor (mental health worker, psychiatrist, etc.)    Negative: Drug use interferes with work or school    Negative: Fever present > 3 days (72 hours)    Negative: Pregnant and admits to substance abuse problem    Protocols used: SUBSTANCE ABUSE AND [id number]

## 2023-03-08 NOTE — TELEPHONE ENCOUNTER
Call from patient who is going through Suboxone withdrawal. Patient last took Suboxone 4 days ago.    Patient feels his temp fluctuates, feels somewhat anxious and exhausted. No vomiting but does have mild diarrhea    Main reason for patient's call is needing work excuse for today.  He has been working the past couple of days. Patient feels he is unable to work today.    Informed if he wants to be evaluated for treatment of his withdrawals, he would need to be evaluated in the ED.    Advised clinic will call him back w/ recommendation.    Antwon Toth RN, BSN  Triage Nurse Advisor

## 2023-03-09 NOTE — TELEPHONE ENCOUNTER
Left pt a detailed message to call back.  The letter is ready to be printed.  Patient wanted the letter sent to his mychart but he doesn't have mychart active.  Please ask patient how he would like to get the letter when he calls back.

## 2023-03-09 NOTE — TELEPHONE ENCOUNTER
Pt returned call. Pt states he would like the letter sent to him via his [employer]. Pt state will activate his [employer] account immediately.     Philomena Reynoso

## 2023-03-17 ENCOUNTER — E-VISIT (OUTPATIENT)
Dept: INTERNAL MEDICINE | Facility: CLINIC | Age: 43
End: 2023-03-17
Payer: COMMERCIAL

## 2023-03-17 DIAGNOSIS — F11.90 OPIOID USE DISORDER: Primary | ICD-10-CM

## 2023-03-17 PROCEDURE — 99207 PR NON-BILLABLE SERV PER CHARTING: CPT | Performed by: INTERNAL MEDICINE

## 2023-03-17 ASSESSMENT — ANXIETY QUESTIONNAIRES
2. NOT BEING ABLE TO STOP OR CONTROL WORRYING: NOT AT ALL
8. IF YOU CHECKED OFF ANY PROBLEMS, HOW DIFFICULT HAVE THESE MADE IT FOR YOU TO DO YOUR WORK, TAKE CARE OF THINGS AT HOME, OR GET ALONG WITH OTHER PEOPLE?: VERY DIFFICULT
1. FEELING NERVOUS, ANXIOUS, OR ON EDGE: MORE THAN HALF THE DAYS
GAD7 TOTAL SCORE: 8
6. BECOMING EASILY ANNOYED OR IRRITABLE: NOT AT ALL
7. FEELING AFRAID AS IF SOMETHING AWFUL MIGHT HAPPEN: NOT AT ALL
3. WORRYING TOO MUCH ABOUT DIFFERENT THINGS: NOT AT ALL
5. BEING SO RESTLESS THAT IT IS HARD TO SIT STILL: NEARLY EVERY DAY
4. TROUBLE RELAXING: NEARLY EVERY DAY
GAD7 TOTAL SCORE: 8
GAD7 TOTAL SCORE: 8
7. FEELING AFRAID AS IF SOMETHING AWFUL MIGHT HAPPEN: NOT AT ALL

## 2023-03-18 ASSESSMENT — ANXIETY QUESTIONNAIRES: GAD7 TOTAL SCORE: 8

## 2023-03-22 RX ORDER — TRAZODONE HYDROCHLORIDE 50 MG/1
50 TABLET, FILM COATED ORAL AT BEDTIME
Qty: 30 TABLET | Refills: 1 | Status: SHIPPED | OUTPATIENT
Start: 2023-03-22 | End: 2023-03-28

## 2023-03-25 ENCOUNTER — HEALTH MAINTENANCE LETTER (OUTPATIENT)
Age: 43
End: 2023-03-25

## 2023-03-28 ENCOUNTER — OFFICE VISIT (OUTPATIENT)
Dept: INTERNAL MEDICINE | Facility: CLINIC | Age: 43
End: 2023-03-28
Payer: COMMERCIAL

## 2023-03-28 VITALS
HEIGHT: 73 IN | TEMPERATURE: 98.4 F | SYSTOLIC BLOOD PRESSURE: 120 MMHG | DIASTOLIC BLOOD PRESSURE: 84 MMHG | OXYGEN SATURATION: 98 % | WEIGHT: 181 LBS | BODY MASS INDEX: 23.99 KG/M2 | RESPIRATION RATE: 16 BRPM | HEART RATE: 96 BPM

## 2023-03-28 DIAGNOSIS — G47.00 INSOMNIA, UNSPECIFIED TYPE: ICD-10-CM

## 2023-03-28 DIAGNOSIS — F11.90 OPIOID USE DISORDER: Primary | ICD-10-CM

## 2023-03-28 DIAGNOSIS — F41.9 ANXIETY: ICD-10-CM

## 2023-03-28 PROCEDURE — 96127 BRIEF EMOTIONAL/BEHAV ASSMT: CPT | Performed by: NURSE PRACTITIONER

## 2023-03-28 PROCEDURE — 99214 OFFICE O/P EST MOD 30 MIN: CPT | Performed by: NURSE PRACTITIONER

## 2023-03-28 RX ORDER — VENLAFAXINE HYDROCHLORIDE 75 MG/1
75 CAPSULE, EXTENDED RELEASE ORAL DAILY
Qty: 90 CAPSULE | Refills: 1 | Status: SHIPPED | OUTPATIENT
Start: 2023-03-28 | End: 2023-11-28

## 2023-03-28 RX ORDER — HYDROXYZINE PAMOATE 25 MG/1
25 CAPSULE ORAL 3 TIMES DAILY PRN
Qty: 90 CAPSULE | Refills: 1 | Status: SHIPPED | OUTPATIENT
Start: 2023-03-28 | End: 2023-11-28

## 2023-03-28 RX ORDER — TRAZODONE HYDROCHLORIDE 50 MG/1
50 TABLET, FILM COATED ORAL AT BEDTIME
Qty: 90 TABLET | Refills: 1 | Status: SHIPPED | OUTPATIENT
Start: 2023-03-28 | End: 2023-11-28

## 2023-03-28 RX ORDER — VENLAFAXINE HYDROCHLORIDE 75 MG/1
75 CAPSULE, EXTENDED RELEASE ORAL DAILY
Qty: 90 CAPSULE | Refills: 1 | Status: SHIPPED | OUTPATIENT
Start: 2023-03-28 | End: 2023-03-28

## 2023-03-28 ASSESSMENT — PATIENT HEALTH QUESTIONNAIRE - PHQ9
SUM OF ALL RESPONSES TO PHQ QUESTIONS 1-9: 12
SUM OF ALL RESPONSES TO PHQ QUESTIONS 1-9: 12
10. IF YOU CHECKED OFF ANY PROBLEMS, HOW DIFFICULT HAVE THESE PROBLEMS MADE IT FOR YOU TO DO YOUR WORK, TAKE CARE OF THINGS AT HOME, OR GET ALONG WITH OTHER PEOPLE: VERY DIFFICULT

## 2023-03-28 ASSESSMENT — ANXIETY QUESTIONNAIRES
3. WORRYING TOO MUCH ABOUT DIFFERENT THINGS: NOT AT ALL
GAD7 TOTAL SCORE: 9
7. FEELING AFRAID AS IF SOMETHING AWFUL MIGHT HAPPEN: NOT AT ALL
6. BECOMING EASILY ANNOYED OR IRRITABLE: NOT AT ALL
8. IF YOU CHECKED OFF ANY PROBLEMS, HOW DIFFICULT HAVE THESE MADE IT FOR YOU TO DO YOUR WORK, TAKE CARE OF THINGS AT HOME, OR GET ALONG WITH OTHER PEOPLE?: VERY DIFFICULT
5. BEING SO RESTLESS THAT IT IS HARD TO SIT STILL: NEARLY EVERY DAY
2. NOT BEING ABLE TO STOP OR CONTROL WORRYING: NOT AT ALL
GAD7 TOTAL SCORE: 9
IF YOU CHECKED OFF ANY PROBLEMS ON THIS QUESTIONNAIRE, HOW DIFFICULT HAVE THESE PROBLEMS MADE IT FOR YOU TO DO YOUR WORK, TAKE CARE OF THINGS AT HOME, OR GET ALONG WITH OTHER PEOPLE: VERY DIFFICULT
4. TROUBLE RELAXING: NEARLY EVERY DAY
7. FEELING AFRAID AS IF SOMETHING AWFUL MIGHT HAPPEN: NOT AT ALL
1. FEELING NERVOUS, ANXIOUS, OR ON EDGE: NEARLY EVERY DAY
GAD7 TOTAL SCORE: 9

## 2023-03-28 ASSESSMENT — ENCOUNTER SYMPTOMS: NERVOUS/ANXIOUS: 1

## 2023-03-28 NOTE — PROGRESS NOTES
Assessment & Plan     Anxiety  His anxiety is not well controlled at the moment.  He has used venlafaxine for many years and is looking to titrate up on his dose.  We will send in a 75 mg capsule to be taken in addition to his 150 mg capsule.  Hydroxyzine as needed for anxiety flares    - venlafaxine (EFFEXOR XR) 75 MG 24 hr capsule; Take 1 capsule (75 mg) by mouth daily  - hydrOXYzine (VISTARIL) 25 MG capsule; Take 1 capsule (25 mg) by mouth 3 times daily as needed for anxiety    Insomnia, unspecified type  He sees good results from his trazodone.  Looking for refills today    Opioid use disorder  He recently finished up a Suboxone prescription after being treated by substance abuse provider and is not looking to restart.  He has overcome his withdrawals since discontinuing the Suboxone 2 weeks ago.    Having some looser stools.  We talked about using a fiber supplement to help bulk up his stools.    Patient Instructions   Lets increase your venlafaxine to 225 mg daily.    You can use hydroxyzine up to 3 times daily as needed for anxiety.  You can take 1 to 2 tablets at a time.    Refills of the trazodone sent in.    I would recommend that you schedule a wellness visit with your PCP sometime within the next 3 to 6 months.    Use Citrucel fiber powder supplement to help with the loose stools      Depression Screening Follow Up    PHQ 3/28/2023   PHQ-9 Total Score 12   Q9: Thoughts of better off dead/self-harm past 2 weeks Not at all       Chapito Gilman CNP  [facility]    Subjective   Aakash is a 42 year old, presenting for the following health issues:    Anxiety (New issue with anxiety, finished Suboxone  ~2 weeks ago, now having anxiety and not sleeping well, hard to fall asleep)    Additional Questions 3/28/2023   Roomed by Dorys JUÁREZ   Accompanied by wife - Edith     Anxiety    History of Present Illness       Mental Health Follow-up:  Patient presents to follow-up on  "Anxiety.    Patient's anxiety since last visit has been:  Bad  The patient is having other symptoms associated with anxiety.  Any significant life events: other  Patient is feeling anxious or having panic attacks.  Patient has no concerns about alcohol or drug use.    He eats 0-1 servings of fruits and vegetables daily.He consumes 2 sweetened beverage(s) daily.He exercises with enough effort to increase his heart rate 9 or less minutes per day.  He exercises with enough effort to increase his heart rate 3 or less days per week.   He is taking medications regularly.    Today's PHQ-9         PHQ-9 Total Score: 12    PHQ-9 Q9 Thoughts of better off dead/self-harm past 2 weeks :   Not at all    How difficult have these problems made it for you to do your work, take care of things at home, or get along with other people: Very difficult  Today's GAB-7 Score: 9         Review of Systems   Psychiatric/Behavioral: The patient is nervous/anxious.       Constitutional, HEENT, cardiovascular, pulmonary, gi and gu systems are negative, except as otherwise noted.      Objective    /84 (BP Location: Right arm, Patient Position: Sitting, Cuff Size: Adult Regular)   Pulse 96   Temp 98.4  F (36.9  C)   Resp 16   Ht 1.842 m (6' 0.5\")   Wt 82.1 kg (181 lb)   SpO2 98%   BMI 24.21 kg/m    Body mass index is 24.21 kg/m .  Physical Exam     Patient is healthy-appearing and in no acute distress.  Accompanied by his wife      "

## 2023-03-28 NOTE — PATIENT INSTRUCTIONS
Lets increase your venlafaxine to 225 mg daily.    You can use hydroxyzine up to 3 times daily as needed for anxiety.  You can take 1 to 2 tablets at a time.    Refills of the trazodone sent in.    I would recommend that you schedule a wellness visit with your PCP sometime within the next 3 to 6 months.    Use Citrucel fiber powder supplement to help with the loose stools

## 2023-05-27 ENCOUNTER — HEALTH MAINTENANCE LETTER (OUTPATIENT)
Age: 43
End: 2023-05-27

## 2023-11-28 ENCOUNTER — OFFICE VISIT (OUTPATIENT)
Dept: FAMILY MEDICINE | Facility: CLINIC | Age: 43
End: 2023-11-28
Payer: COMMERCIAL

## 2023-11-28 VITALS
DIASTOLIC BLOOD PRESSURE: 82 MMHG | BODY MASS INDEX: 23.46 KG/M2 | HEART RATE: 78 BPM | OXYGEN SATURATION: 98 % | RESPIRATION RATE: 16 BRPM | SYSTOLIC BLOOD PRESSURE: 126 MMHG | HEIGHT: 73 IN | TEMPERATURE: 97.8 F | WEIGHT: 177 LBS

## 2023-11-28 DIAGNOSIS — J02.9 SORE THROAT: ICD-10-CM

## 2023-11-28 DIAGNOSIS — F33.1 MODERATE EPISODE OF RECURRENT MAJOR DEPRESSIVE DISORDER (H): ICD-10-CM

## 2023-11-28 DIAGNOSIS — F11.90 OPIOID USE DISORDER: ICD-10-CM

## 2023-11-28 DIAGNOSIS — Z13.228 SCREENING FOR METABOLIC DISORDER: ICD-10-CM

## 2023-11-28 DIAGNOSIS — N52.9 ERECTILE DYSFUNCTION, UNSPECIFIED ERECTILE DYSFUNCTION TYPE: ICD-10-CM

## 2023-11-28 DIAGNOSIS — Z13.220 LIPID SCREENING: ICD-10-CM

## 2023-11-28 DIAGNOSIS — F41.9 ANXIETY: Primary | ICD-10-CM

## 2023-11-28 LAB
ALBUMIN SERPL BCG-MCNC: 4.3 G/DL (ref 3.5–5.2)
ALP SERPL-CCNC: 105 U/L (ref 40–150)
ALT SERPL W P-5'-P-CCNC: 19 U/L (ref 0–70)
ANION GAP SERPL CALCULATED.3IONS-SCNC: 11 MMOL/L (ref 7–15)
AST SERPL W P-5'-P-CCNC: 16 U/L (ref 0–45)
BILIRUB SERPL-MCNC: 0.2 MG/DL
BUN SERPL-MCNC: 16.2 MG/DL (ref 6–20)
CALCIUM SERPL-MCNC: 9.2 MG/DL (ref 8.6–10)
CHLORIDE SERPL-SCNC: 106 MMOL/L (ref 98–107)
CHOLEST SERPL-MCNC: 185 MG/DL
CREAT SERPL-MCNC: 0.86 MG/DL (ref 0.67–1.17)
DEPRECATED HCO3 PLAS-SCNC: 24 MMOL/L (ref 22–29)
DEPRECATED S PYO AG THROAT QL EIA: NEGATIVE
EGFRCR SERPLBLD CKD-EPI 2021: >90 ML/MIN/1.73M2
GLUCOSE SERPL-MCNC: 100 MG/DL (ref 70–99)
GROUP A STREP BY PCR: NOT DETECTED
HDLC SERPL-MCNC: 38 MG/DL
LDLC SERPL CALC-MCNC: 102 MG/DL
NONHDLC SERPL-MCNC: 147 MG/DL
POTASSIUM SERPL-SCNC: 3.9 MMOL/L (ref 3.4–5.3)
PROT SERPL-MCNC: 6.6 G/DL (ref 6.4–8.3)
SODIUM SERPL-SCNC: 141 MMOL/L (ref 135–145)
TRIGL SERPL-MCNC: 226 MG/DL

## 2023-11-28 PROCEDURE — 80061 LIPID PANEL: CPT | Performed by: FAMILY MEDICINE

## 2023-11-28 PROCEDURE — 87651 STREP A DNA AMP PROBE: CPT | Performed by: FAMILY MEDICINE

## 2023-11-28 PROCEDURE — 36415 COLL VENOUS BLD VENIPUNCTURE: CPT | Performed by: FAMILY MEDICINE

## 2023-11-28 PROCEDURE — 80053 COMPREHEN METABOLIC PANEL: CPT | Performed by: FAMILY MEDICINE

## 2023-11-28 PROCEDURE — 99214 OFFICE O/P EST MOD 30 MIN: CPT | Performed by: FAMILY MEDICINE

## 2023-11-28 RX ORDER — VENLAFAXINE HYDROCHLORIDE 150 MG/1
CAPSULE, EXTENDED RELEASE ORAL
Qty: 90 CAPSULE | Refills: 1 | Status: SHIPPED | OUTPATIENT
Start: 2023-11-28 | End: 2024-07-22

## 2023-11-28 RX ORDER — TRAZODONE HYDROCHLORIDE 50 MG/1
50 TABLET, FILM COATED ORAL AT BEDTIME
Qty: 90 TABLET | Refills: 1 | Status: SHIPPED | OUTPATIENT
Start: 2023-11-28

## 2023-11-28 RX ORDER — BUSPIRONE HYDROCHLORIDE 5 MG/1
5 TABLET ORAL 2 TIMES DAILY
Qty: 60 TABLET | Refills: 1 | Status: SHIPPED | OUTPATIENT
Start: 2023-11-28 | End: 2024-08-09

## 2023-11-28 RX ORDER — SILDENAFIL CITRATE 20 MG/1
20-100 TABLET ORAL DAILY PRN
Qty: 30 TABLET | Refills: 0 | Status: SHIPPED | OUTPATIENT
Start: 2023-11-28 | End: 2023-12-07

## 2023-11-28 ASSESSMENT — ENCOUNTER SYMPTOMS
COUGH: 0
RHINORRHEA: 1
SORE THROAT: 1
ABDOMINAL PAIN: 0
ARTHRALGIAS: 0
AGITATION: 0
ABDOMINAL DISTENTION: 1
NERVOUS/ANXIOUS: 1
BACK PAIN: 0
DIFFICULTY URINATING: 0

## 2023-11-28 ASSESSMENT — ANXIETY QUESTIONNAIRES
GAD7 TOTAL SCORE: 2
1. FEELING NERVOUS, ANXIOUS, OR ON EDGE: SEVERAL DAYS
GAD7 TOTAL SCORE: 2
5. BEING SO RESTLESS THAT IT IS HARD TO SIT STILL: NOT AT ALL
6. BECOMING EASILY ANNOYED OR IRRITABLE: NOT AT ALL
3. WORRYING TOO MUCH ABOUT DIFFERENT THINGS: NOT AT ALL
7. FEELING AFRAID AS IF SOMETHING AWFUL MIGHT HAPPEN: NOT AT ALL
4. TROUBLE RELAXING: SEVERAL DAYS
2. NOT BEING ABLE TO STOP OR CONTROL WORRYING: NOT AT ALL
IF YOU CHECKED OFF ANY PROBLEMS ON THIS QUESTIONNAIRE, HOW DIFFICULT HAVE THESE PROBLEMS MADE IT FOR YOU TO DO YOUR WORK, TAKE CARE OF THINGS AT HOME, OR GET ALONG WITH OTHER PEOPLE: SOMEWHAT DIFFICULT

## 2023-11-28 ASSESSMENT — PATIENT HEALTH QUESTIONNAIRE - PHQ9
SUM OF ALL RESPONSES TO PHQ QUESTIONS 1-9: 3
SUM OF ALL RESPONSES TO PHQ QUESTIONS 1-9: 3
10. IF YOU CHECKED OFF ANY PROBLEMS, HOW DIFFICULT HAVE THESE PROBLEMS MADE IT FOR YOU TO DO YOUR WORK, TAKE CARE OF THINGS AT HOME, OR GET ALONG WITH OTHER PEOPLE: SOMEWHAT DIFFICULT

## 2023-11-28 NOTE — LETTER
November 28, 2023      Anival Moore  [address] [facility] [address]        To Whom It May Concern:    Anival Moore  was seen on 11/28/2023.  Please excuse him  until  due to illness.  Okay to return to work on 11/29/2023 with no restrictions .        Sincerely,        Cait Orourke MD

## 2023-11-28 NOTE — PROGRESS NOTES
Assessment & Plan     Anxiety  Patient feels anxiety not well controlled .  Would like to add additional medication   Will add buspar .  - busPIRone (BUSPAR) 5 MG tablet; Take 1 tablet (5 mg) by mouth 2 times daily  - traZODone (DESYREL) 50 MG tablet; Take 1 tablet (50 mg) by mouth at bedtime    Moderate episode of recurrent major depressive disorder (H)    - venlafaxine (EFFEXOR XR) 150 MG 24 hr capsule; TAKE 1 CAPSULE(150 MG) BY MOUTH DAILY    Erectile dysfunction, unspecified erectile dysfunction type    - sildenafil (REVATIO) 20 MG tablet; Take 1-5 tablets ( mg) by mouth daily as needed (ED)    Sore throat  Likely viral discussed supportive care .    - Streptococcus A Rapid Screen w/Reflex to PCR - Clinic Collect  - Group A Streptococcus PCR Throat Swab    Opioid use disorder  Completed sobaxane treatment .    Screening for metabolic disorder    - Comprehensive metabolic panel (BMP + Alb, Alk Phos, ALT, AST, Total. Bili, TP)    Lipid screening    - Lipid panel reflex to direct LDL Fasting    Cait Orourke MD  [facility]    Moisés Finn is a 42 year old, presenting for the following health issues:    Establish Care, Depression, and Pharyngitis (For 1 week. Negative for covid.)        11/28/2023     9:22 AM   Additional Questions   Roomed by Carmelita GIL       History of Present Illness       Reason for visit:  New primary    He eats 0-1 servings of fruits and vegetables daily.He consumes 1 sweetened beverage(s) daily.He exercises with enough effort to increase his heart rate 30 to 60 minutes per day.  He exercises with enough effort to increase his heart rate 3 or less days per week.   He is taking medications regularly.         Depression Followup  How are you doing with your depression since your last visit? No change  Are you having other symptoms that might be associated with depression? Yes:  anxiety  Have you had a significant life event?  No   Are you feeling anxious or  having panic attacks?   Yes:  feeling anxious  Do you have any concerns with your use of alcohol or other drugs? No    Social History     Tobacco Use    Smoking status: Light Smoker     Types: Cigarettes    Smokeless tobacco: Never   Vaping Use    Vaping Use: Never used   Substance Use Topics    Alcohol use: Yes     Alcohol/week: 0.0 standard drinks of alcohol     Comment: occasional 1x/few months.    Drug use: Yes     Types: Marijuana         6/14/2021    11:00 AM 3/28/2023     8:40 AM 11/28/2023     9:11 AM   PHQ   PHQ-9 Total Score 1 12    12 3   Q9: Thoughts of better off dead/self-harm past 2 weeks Not at all Not at all    Not at all Not at all         3/17/2023     6:54 PM 3/28/2023     8:42 AM 11/28/2023     9:12 AM   GAB-7 SCORE   Total Score 8 (mild anxiety) 9 (mild anxiety) 2 (minimal anxiety)   Total Score 8 9    9 2         11/28/2023     9:11 AM   Last PHQ-9   1.  Little interest or pleasure in doing things 1   2.  Feeling down, depressed, or hopeless 0   3.  Trouble falling or staying asleep, or sleeping too much 0   4.  Feeling tired or having little energy 2   5.  Poor appetite or overeating 0   6.  Feeling bad about yourself 0   7.  Trouble concentrating 0   8.  Moving slowly or restless 0   Q9: Thoughts of better off dead/self-harm past 2 weeks 0   PHQ-9 Total Score 3         11/28/2023     9:12 AM   GAB-7    1. Feeling nervous, anxious, or on edge 1   2. Not being able to stop or control worrying 0   3. Worrying too much about different things 0   4. Trouble relaxing 1   5. Being so restless that it is hard to sit still 0   6. Becoming easily annoyed or irritable 0   7. Feeling afraid, as if something awful might happen 0   GAB-7 Total Score 2   If you checked any problems, how difficult have they made it for you to do your work, take care of things at home, or get along with other people? Somewhat difficult           Review of Systems   HENT:  Positive for congestion, rhinorrhea and sore throat.   "  Respiratory:  Negative for cough.    Gastrointestinal:  Positive for abdominal distention. Negative for abdominal pain.   Genitourinary:  Negative for difficulty urinating.   Musculoskeletal:  Negative for arthralgias and back pain.   Psychiatric/Behavioral:  Negative for agitation and behavioral problems. The patient is nervous/anxious.             Objective    /82 (Cuff Size: Adult Regular)   Pulse 78   Temp 97.8  F (36.6  C) (Oral)   Resp 16   Ht 6' 0.5\" (1.842 m)   Wt 177 lb (80.3 kg)   SpO2 98%   BMI 23.68 kg/m    Body mass index is 23.68 kg/m .  Physical Exam  Constitutional:       Appearance: Normal appearance.   HENT:      Nose: Congestion present.      Mouth/Throat:      Pharynx: Posterior oropharyngeal erythema present.   Cardiovascular:      Rate and Rhythm: Normal rate and regular rhythm.   Pulmonary:      Effort: Pulmonary effort is normal.   Abdominal:      General: Abdomen is flat.   Skin:     General: Skin is warm.   Neurological:      General: No focal deficit present.      Mental Status: He is alert.   Psychiatric:         Mood and Affect: Mood normal.         Behavior: Behavior normal.                    "

## 2023-11-28 NOTE — COMMUNITY RESOURCES LIST (ENGLISH)
11/28/2023   [facility]  N/A  For questions about this resource list or additional care needs, please contact your primary care clinic or care manager.  Phone: [phone]   Email: N/A   Address: [address] [place] [id number]   Hours: N/A        Financial Stability       Rent and mortgage payment assistance  1  [address] [place] [address] - Rent payment assistance Distance: 7.72 miles      In-Person, Phone/Virtual   [address] [place] [address]  Language: English  Hours: Mon 8:00 AM - 4:00 PM , Tue 8:00 AM - 7:00 PM , Wed - Thu 8:00 AM - 4:00 PM  Fees: Free   Phone: (142) 210-1347 Email: info@[facility] Website: https://[address] [place] [address].Inneractive/resources/resource-centers/     2  Community [employer] Partnership (CAP) [facility]  [facility] Homeless Prevention Assistance Program (FHPAP) Distance: 8.61 miles      In-Person   [address] [place] [address]  Language: English, Welsh  Hours: Mon - Fri 8:00 AM - 4:30 PM  Fees: Free   Phone: (370) 981-3386 Email: info@[employer] Website: http://www.capagenTISSUELAB.org          Important Numbers & Websites       Emergency Services   911  [facility]   311  Poison Control   (279) 642-7668  Suicide Prevention Lifeline   (374) 276-8880 (TALK)  Child Abuse Hotline   (655) 476-2647 (4-A-Child)  Sexual Assault Hotline   (737) 859-9407 (HOPE)  National Runaway Safeline   (409) 491-4243 (RUNAWAY)  All-Options Talkline   (102) 940-8613  Substance Abuse Referral   (752) 601-1166 (HELP)

## 2023-12-05 DIAGNOSIS — N52.9 ERECTILE DYSFUNCTION, UNSPECIFIED ERECTILE DYSFUNCTION TYPE: ICD-10-CM

## 2023-12-06 RX ORDER — SILDENAFIL CITRATE 20 MG/1
20-100 TABLET ORAL DAILY PRN
Qty: 30 TABLET | Refills: 0 | OUTPATIENT
Start: 2023-12-06

## 2023-12-07 ENCOUNTER — MYC REFILL (OUTPATIENT)
Dept: FAMILY MEDICINE | Facility: CLINIC | Age: 43
End: 2023-12-07
Payer: COMMERCIAL

## 2023-12-07 DIAGNOSIS — N52.9 ERECTILE DYSFUNCTION, UNSPECIFIED ERECTILE DYSFUNCTION TYPE: ICD-10-CM

## 2023-12-07 RX ORDER — SILDENAFIL CITRATE 20 MG/1
20-100 TABLET ORAL DAILY PRN
Qty: 30 TABLET | Refills: 0 | Status: SHIPPED | OUTPATIENT
Start: 2023-12-07 | End: 2023-12-18

## 2023-12-18 ENCOUNTER — MYC REFILL (OUTPATIENT)
Dept: FAMILY MEDICINE | Facility: CLINIC | Age: 43
End: 2023-12-18

## 2023-12-18 ENCOUNTER — E-VISIT (OUTPATIENT)
Dept: FAMILY MEDICINE | Facility: CLINIC | Age: 43
End: 2023-12-18
Payer: COMMERCIAL

## 2023-12-18 DIAGNOSIS — N52.9 ERECTILE DYSFUNCTION, UNSPECIFIED ERECTILE DYSFUNCTION TYPE: ICD-10-CM

## 2023-12-18 DIAGNOSIS — J02.9 SORE THROAT: Primary | ICD-10-CM

## 2023-12-18 PROCEDURE — 99207 PR NON-BILLABLE SERV PER CHARTING: CPT | Performed by: FAMILY MEDICINE

## 2023-12-18 NOTE — PATIENT INSTRUCTIONS
Dear Aakash,    We are sorry you are not feeling well. Based on the responses you provided, it is recommended that you be seen in-person in clinic so we can better evaluate your symptoms. Please schedule this visit in [facility]. You will have a Schedule Now button in [employer] to help with scheduling this appointment. Otherwise, you can call [phone] to schedule an appointment.     You will not be charged for this eVisit. Thank you for trusting us with your care.     Cait Orourke MD

## 2023-12-19 RX ORDER — SILDENAFIL CITRATE 20 MG/1
20-100 TABLET ORAL DAILY PRN
Qty: 30 TABLET | Refills: 1 | Status: SHIPPED | OUTPATIENT
Start: 2023-12-19 | End: 2024-01-01

## 2023-12-19 NOTE — TELEPHONE ENCOUNTER
Prescription approved per [facility] Refill Protocol.  Geraldine Medina, RN  [facility] Triage Nurse

## 2024-01-01 ENCOUNTER — MYC REFILL (OUTPATIENT)
Dept: FAMILY MEDICINE | Facility: CLINIC | Age: 44
End: 2024-01-01
Payer: COMMERCIAL

## 2024-01-01 DIAGNOSIS — N52.9 ERECTILE DYSFUNCTION, UNSPECIFIED ERECTILE DYSFUNCTION TYPE: ICD-10-CM

## 2024-01-02 RX ORDER — SILDENAFIL CITRATE 20 MG/1
20-100 TABLET ORAL DAILY PRN
Qty: 30 TABLET | Refills: 0 | Status: SHIPPED | OUTPATIENT
Start: 2024-01-02 | End: 2024-01-12

## 2024-01-12 ENCOUNTER — MYC REFILL (OUTPATIENT)
Dept: FAMILY MEDICINE | Facility: CLINIC | Age: 44
End: 2024-01-12
Payer: COMMERCIAL

## 2024-01-12 DIAGNOSIS — N52.9 ERECTILE DYSFUNCTION, UNSPECIFIED ERECTILE DYSFUNCTION TYPE: ICD-10-CM

## 2024-01-15 RX ORDER — SILDENAFIL CITRATE 20 MG/1
20-100 TABLET ORAL DAILY PRN
Qty: 30 TABLET | Refills: 10 | Status: SHIPPED | OUTPATIENT
Start: 2024-01-15

## 2024-01-25 ENCOUNTER — MYC REFILL (OUTPATIENT)
Dept: FAMILY MEDICINE | Facility: CLINIC | Age: 44
End: 2024-01-25
Payer: COMMERCIAL

## 2024-01-25 DIAGNOSIS — N52.9 ERECTILE DYSFUNCTION, UNSPECIFIED ERECTILE DYSFUNCTION TYPE: ICD-10-CM

## 2024-01-26 RX ORDER — SILDENAFIL CITRATE 20 MG/1
20-100 TABLET ORAL DAILY PRN
Qty: 30 TABLET | Refills: 10 | OUTPATIENT
Start: 2024-01-26

## 2024-07-22 ENCOUNTER — MYC REFILL (OUTPATIENT)
Dept: FAMILY MEDICINE | Facility: CLINIC | Age: 44
End: 2024-07-22

## 2024-07-22 DIAGNOSIS — F33.1 MODERATE EPISODE OF RECURRENT MAJOR DEPRESSIVE DISORDER (H): ICD-10-CM

## 2024-07-22 RX ORDER — VENLAFAXINE HYDROCHLORIDE 150 MG/1
CAPSULE, EXTENDED RELEASE ORAL
Qty: 90 CAPSULE | Refills: 0 | Status: SHIPPED | OUTPATIENT
Start: 2024-07-22 | End: 2024-08-09

## 2024-08-03 ENCOUNTER — HEALTH MAINTENANCE LETTER (OUTPATIENT)
Age: 44
End: 2024-08-03

## 2024-08-09 ENCOUNTER — VIRTUAL VISIT (OUTPATIENT)
Dept: FAMILY MEDICINE | Facility: CLINIC | Age: 44
End: 2024-08-09

## 2024-08-09 DIAGNOSIS — F41.9 ANXIETY: ICD-10-CM

## 2024-08-09 DIAGNOSIS — F33.1 MODERATE EPISODE OF RECURRENT MAJOR DEPRESSIVE DISORDER (H): ICD-10-CM

## 2024-08-09 PROCEDURE — 99213 OFFICE O/P EST LOW 20 MIN: CPT | Mod: 95 | Performed by: FAMILY MEDICINE

## 2024-08-09 RX ORDER — BUSPIRONE HYDROCHLORIDE 5 MG/1
5 TABLET ORAL 2 TIMES DAILY
Qty: 60 TABLET | Refills: 2 | Status: SHIPPED | OUTPATIENT
Start: 2024-08-09

## 2024-08-09 RX ORDER — VENLAFAXINE HYDROCHLORIDE 150 MG/1
CAPSULE, EXTENDED RELEASE ORAL
Qty: 90 CAPSULE | Refills: 3 | Status: SHIPPED | OUTPATIENT
Start: 2024-08-09

## 2024-08-09 NOTE — PROGRESS NOTES
Aakash is a 43 year old who is being evaluated via a billable video visit.    How would you like to obtain your AVS? MyChart  If the video visit is dropped, the invitation should be resent by: Text to cell phone: [phone]  Will anyone else be joining your video visit? No      Assessment & Plan     (F33.1) Moderate episode of recurrent major depressive disorder (H)  Comment:   Plan: venlafaxine (EFFEXOR XR) 150 MG 24 hr capsule            (F41.9) Anxiety  Comment:   Plan: busPIRone (BUSPAR) 5 MG tablet            Experiencing some financial concern discussed to speak to care coordinator .      Nicotine/Tobacco Cessation  He reports that he has been smoking cigarettes. He has never used smokeless tobacco.  Nicotine/Tobacco Cessation Plan  Self help information given to patient      Subjective   Aakash is a 43 year old, presenting for the following health issues:  Recheck Medication      8/9/2024     3:10 PM   Additional Questions   Roomed by Terry   Accompanied by self     Video Start Time: 3:21 PM    HPI         Review of Systems  CONSTITUTIONAL: NEGATIVE for fever, chills, change in weight  ENT/MOUTH: NEGATIVE for ear, mouth and throat problems  RESP: NEGATIVE for significant cough or SOB  CV: NEGATIVE for chest pain, palpitations or peripheral edema      Objective           Vitals:  No vitals were obtained today due to virtual visit.    Physical Exam   GENERAL: alert and no distress  EYES: Eyes grossly normal to inspection.  No discharge or erythema, or obvious scleral/conjunctival abnormalities.  RESP: No audible wheeze, cough, or visible cyanosis.    SKIN: Visible skin clear. No significant rash, abnormal pigmentation or lesions.  NEURO: Cranial nerves grossly intact.  Mentation and speech appropriate for age.  PSYCH: Appropriate affect, tone, and pace of words          Video-Visit Details    Type of service:  Video Visit   Video End Time:3:45 PM  Originating Location (pt. Location): Home    Distant Location  (provider location):  On-site  Platform used for Video Visit: Jun  Signed Electronically by: Cait Orourke MD

## 2024-09-16 ENCOUNTER — APPOINTMENT (OUTPATIENT)
Dept: GENERAL RADIOLOGY | Facility: CLINIC | Age: 44
End: 2024-09-16
Attending: EMERGENCY MEDICINE

## 2024-09-16 ENCOUNTER — HOSPITAL ENCOUNTER (EMERGENCY)
Facility: CLINIC | Age: 44
Discharge: LEFT BEFORE DISCHARGE | End: 2024-09-16
Admitting: EMERGENCY MEDICINE

## 2024-09-16 VITALS
WEIGHT: 162.26 LBS | SYSTOLIC BLOOD PRESSURE: 142 MMHG | HEART RATE: 108 BPM | DIASTOLIC BLOOD PRESSURE: 86 MMHG | HEIGHT: 72 IN | RESPIRATION RATE: 18 BRPM | OXYGEN SATURATION: 97 % | BODY MASS INDEX: 21.98 KG/M2 | TEMPERATURE: 97.6 F

## 2024-09-16 LAB
ANION GAP SERPL CALCULATED.3IONS-SCNC: 11 MMOL/L (ref 7–15)
BASOPHILS # BLD AUTO: 0.1 10E3/UL (ref 0–0.2)
BASOPHILS NFR BLD AUTO: 1 %
BUN SERPL-MCNC: 9.9 MG/DL (ref 6–20)
CALCIUM SERPL-MCNC: 9.3 MG/DL (ref 8.8–10.4)
CHLORIDE SERPL-SCNC: 102 MMOL/L (ref 98–107)
CREAT SERPL-MCNC: 0.96 MG/DL (ref 0.67–1.17)
EGFRCR SERPLBLD CKD-EPI 2021: >90 ML/MIN/1.73M2
EOSINOPHIL # BLD AUTO: 0.2 10E3/UL (ref 0–0.7)
EOSINOPHIL NFR BLD AUTO: 2 %
ERYTHROCYTE [DISTWIDTH] IN BLOOD BY AUTOMATED COUNT: 11.9 % (ref 10–15)
GLUCOSE SERPL-MCNC: 97 MG/DL (ref 70–99)
HCO3 SERPL-SCNC: 25 MMOL/L (ref 22–29)
HCT VFR BLD AUTO: 45.8 % (ref 40–53)
HGB BLD-MCNC: 15.6 G/DL (ref 13.3–17.7)
HOLD SPECIMEN: NORMAL
HOLD SPECIMEN: NORMAL
IMM GRANULOCYTES # BLD: 0 10E3/UL
IMM GRANULOCYTES NFR BLD: 0 %
LYMPHOCYTES # BLD AUTO: 2.9 10E3/UL (ref 0.8–5.3)
LYMPHOCYTES NFR BLD AUTO: 29 %
MCH RBC QN AUTO: 31 PG (ref 26.5–33)
MCHC RBC AUTO-ENTMCNC: 34.1 G/DL (ref 31.5–36.5)
MCV RBC AUTO: 91 FL (ref 78–100)
MONOCYTES # BLD AUTO: 0.7 10E3/UL (ref 0–1.3)
MONOCYTES NFR BLD AUTO: 7 %
NEUTROPHILS # BLD AUTO: 6.1 10E3/UL (ref 1.6–8.3)
NEUTROPHILS NFR BLD AUTO: 61 %
NRBC # BLD AUTO: 0 10E3/UL
NRBC BLD AUTO-RTO: 0 /100
PLATELET # BLD AUTO: 346 10E3/UL (ref 150–450)
POTASSIUM SERPL-SCNC: 4.3 MMOL/L (ref 3.4–5.3)
RBC # BLD AUTO: 5.03 10E6/UL (ref 4.4–5.9)
SODIUM SERPL-SCNC: 138 MMOL/L (ref 135–145)
TROPONIN T SERPL HS-MCNC: <6 NG/L
WBC # BLD AUTO: 9.9 10E3/UL (ref 4–11)

## 2024-09-16 PROCEDURE — 84484 ASSAY OF TROPONIN QUANT: CPT | Performed by: EMERGENCY MEDICINE

## 2024-09-16 PROCEDURE — 85025 COMPLETE CBC W/AUTO DIFF WBC: CPT | Performed by: EMERGENCY MEDICINE

## 2024-09-16 PROCEDURE — 80048 BASIC METABOLIC PNL TOTAL CA: CPT | Performed by: EMERGENCY MEDICINE

## 2024-09-16 PROCEDURE — 93005 ELECTROCARDIOGRAM TRACING: CPT

## 2024-09-16 PROCEDURE — 99281 EMR DPT VST MAYX REQ PHY/QHP: CPT

## 2024-09-16 PROCEDURE — 71046 X-RAY EXAM CHEST 2 VIEWS: CPT

## 2024-09-16 PROCEDURE — 36415 COLL VENOUS BLD VENIPUNCTURE: CPT | Performed by: EMERGENCY MEDICINE

## 2024-09-16 ASSESSMENT — COLUMBIA-SUICIDE SEVERITY RATING SCALE - C-SSRS
6. HAVE YOU EVER DONE ANYTHING, STARTED TO DO ANYTHING, OR PREPARED TO DO ANYTHING TO END YOUR LIFE?: NO
1. IN THE PAST MONTH, HAVE YOU WISHED YOU WERE DEAD OR WISHED YOU COULD GO TO SLEEP AND NOT WAKE UP?: NO
2. HAVE YOU ACTUALLY HAD ANY THOUGHTS OF KILLING YOURSELF IN THE PAST MONTH?: NO

## 2024-09-16 NOTE — ED TRIAGE NOTES
Patient ambulatory to upper chest and back pain for the last three hours. Reports pain when taking a deep breath. Patient concerned for pneumothorax, has a history of them.

## 2024-09-17 LAB
ATRIAL RATE - MUSE: 105 BPM
DIASTOLIC BLOOD PRESSURE - MUSE: NORMAL MMHG
INTERPRETATION ECG - MUSE: NORMAL
P AXIS - MUSE: 76 DEGREES
PR INTERVAL - MUSE: 196 MS
QRS DURATION - MUSE: 76 MS
QT - MUSE: 326 MS
QTC - MUSE: 430 MS
R AXIS - MUSE: 64 DEGREES
SYSTOLIC BLOOD PRESSURE - MUSE: NORMAL MMHG
T AXIS - MUSE: 81 DEGREES
VENTRICULAR RATE- MUSE: 105 BPM

## 2025-01-16 ENCOUNTER — TRANSFERRED RECORDS (OUTPATIENT)
Dept: HEALTH INFORMATION MANAGEMENT | Facility: CLINIC | Age: 45
End: 2025-01-16

## 2025-08-16 ENCOUNTER — HEALTH MAINTENANCE LETTER (OUTPATIENT)
Age: 45
End: 2025-08-16

## (undated) DEVICE — PREP CHLORAPREP 26ML TINTED HI-LITE ORANGE 930815

## (undated) DEVICE — DRAPE MICROSCOPE OPMI ZEISS 48X118" 306071-0000-000

## (undated) DEVICE — SPONGE COTTONOID 1/2X1/2" 80-1400

## (undated) DEVICE — SYR 30ML LL W/O NDL 302832

## (undated) DEVICE — ESU GROUND PAD ADULT W/CORD E7507

## (undated) DEVICE — PACK SMALL SPINE RIDGES

## (undated) DEVICE — RX SURGIFLO HEMOSTATIC MATRIX 8ML 2991

## (undated) DEVICE — SU MONOCRYL 4-0 PS-2 27" UND Y426H

## (undated) DEVICE — SUCTION MANIFOLD NEPTUNE 2 SYS 4 PORT 0702-020-000

## (undated) DEVICE — LINEN ORTHO ACL PACK 5447

## (undated) DEVICE — DRAPE MAYO STAND 23X54 8337

## (undated) DEVICE — GLOVE PROTEXIS W/NEU-THERA 8.0  2D73TE80

## (undated) DEVICE — DRAPE X-RAY TUBE 00-901169-01-OEC

## (undated) DEVICE — GLOVE PROTEXIS POWDER FREE 6.5 ORTHOPEDIC 2D73ET65

## (undated) DEVICE — GLOVE PROTEXIS BLUE W/NEU-THERA 8.5  2D73EB85

## (undated) DEVICE — PAD FOAM WILSON FRAME KIT

## (undated) DEVICE — ESU ELEC BLADE 6" COATED/INSULATED E1455-6

## (undated) DEVICE — SOL NACL 0.9% IRRIG 1000ML BOTTLE 2F7124

## (undated) DEVICE — CUSHION INSERT LG PRONE VIEW JACKSON TABLE

## (undated) DEVICE — DRAPE STERI TOWEL LG 1010

## (undated) DEVICE — GLOVE PROTEXIS BLUE W/NEU-THERA 6.5  2D73EB65

## (undated) DEVICE — GLOVE PROTEXIS MICRO 7.5  2D73PM75

## (undated) DEVICE — SU VICRYL 0 UR-6 27" J603H

## (undated) DEVICE — GLOVE PROTEXIS BLUE W/NEU-THERA 8.0  2D73EB80

## (undated) DEVICE — ADH SKIN CLOSURE PREMIERPRO EXOFIN 1.0ML 3470

## (undated) DEVICE — SU VICRYL 2-0 CT-2 CR 8X18" J726D

## (undated) DEVICE — MIDAS REX DISSECTING TOOL 5.5MM T14MH25

## (undated) RX ORDER — FENTANYL CITRATE 50 UG/ML
INJECTION, SOLUTION INTRAMUSCULAR; INTRAVENOUS
Status: DISPENSED
Start: 2021-06-16

## (undated) RX ORDER — PROPOFOL 10 MG/ML
INJECTION, EMULSION INTRAVENOUS
Status: DISPENSED
Start: 2021-06-16

## (undated) RX ORDER — ACETAMINOPHEN 325 MG/1
TABLET ORAL
Status: DISPENSED
Start: 2021-06-16

## (undated) RX ORDER — GLYCOPYRROLATE 0.2 MG/ML
INJECTION INTRAMUSCULAR; INTRAVENOUS
Status: DISPENSED
Start: 2021-06-16

## (undated) RX ORDER — NEOSTIGMINE METHYLSULFATE 1 MG/ML
VIAL (ML) INJECTION
Status: DISPENSED
Start: 2021-06-16

## (undated) RX ORDER — METHOCARBAMOL 750 MG/1
TABLET, FILM COATED ORAL
Status: DISPENSED
Start: 2021-06-16

## (undated) RX ORDER — BUPIVACAINE HYDROCHLORIDE AND EPINEPHRINE 2.5; 5 MG/ML; UG/ML
INJECTION, SOLUTION EPIDURAL; INFILTRATION; INTRACAUDAL; PERINEURAL
Status: DISPENSED
Start: 2021-06-16

## (undated) RX ORDER — OXYCODONE HYDROCHLORIDE 5 MG/1
TABLET ORAL
Status: DISPENSED
Start: 2021-06-16

## (undated) RX ORDER — CEFAZOLIN SODIUM 2 G/100ML
INJECTION, SOLUTION INTRAVENOUS
Status: DISPENSED
Start: 2021-06-16

## (undated) RX ORDER — LIDOCAINE HYDROCHLORIDE 10 MG/ML
INJECTION, SOLUTION EPIDURAL; INFILTRATION; INTRACAUDAL; PERINEURAL
Status: DISPENSED
Start: 2021-06-16

## (undated) RX ORDER — ONDANSETRON 2 MG/ML
INJECTION INTRAMUSCULAR; INTRAVENOUS
Status: DISPENSED
Start: 2021-06-16

## (undated) RX ORDER — DEXAMETHASONE SODIUM PHOSPHATE 4 MG/ML
INJECTION, SOLUTION INTRA-ARTICULAR; INTRALESIONAL; INTRAMUSCULAR; INTRAVENOUS; SOFT TISSUE
Status: DISPENSED
Start: 2021-06-16